# Patient Record
Sex: FEMALE | Race: WHITE | Employment: FULL TIME | URBAN - METROPOLITAN AREA
[De-identification: names, ages, dates, MRNs, and addresses within clinical notes are randomized per-mention and may not be internally consistent; named-entity substitution may affect disease eponyms.]

---

## 2018-12-07 ENCOUNTER — OFFICE VISIT (OUTPATIENT)
Dept: URGENT CARE | Age: 53
End: 2018-12-07

## 2018-12-07 ENCOUNTER — HOSPITAL ENCOUNTER (EMERGENCY)
Age: 53
Discharge: HOME | End: 2018-12-07
Attending: EMERGENCY MEDICINE | Admitting: EMERGENCY MEDICINE
Payer: COMMERCIAL

## 2018-12-07 ENCOUNTER — APPOINTMENT (OUTPATIENT)
Dept: GENERAL RADIOLOGY | Age: 53
End: 2018-12-07
Attending: PHYSICIAN ASSISTANT
Payer: COMMERCIAL

## 2018-12-07 VITALS
WEIGHT: 190.7 LBS | BODY MASS INDEX: 32.56 KG/M2 | HEART RATE: 77 BPM | RESPIRATION RATE: 18 BRPM | TEMPERATURE: 98.1 F | DIASTOLIC BLOOD PRESSURE: 85 MMHG | OXYGEN SATURATION: 98 % | HEIGHT: 64 IN | SYSTOLIC BLOOD PRESSURE: 149 MMHG

## 2018-12-07 VITALS
SYSTOLIC BLOOD PRESSURE: 192 MMHG | DIASTOLIC BLOOD PRESSURE: 85 MMHG | TEMPERATURE: 98.4 F | WEIGHT: 191 LBS | HEIGHT: 64 IN | RESPIRATION RATE: 18 BRPM | OXYGEN SATURATION: 98 % | HEART RATE: 93 BPM | BODY MASS INDEX: 32.61 KG/M2

## 2018-12-07 DIAGNOSIS — R42 DIZZY: ICD-10-CM

## 2018-12-07 DIAGNOSIS — R11.0 NAUSEA: ICD-10-CM

## 2018-12-07 DIAGNOSIS — R03.0 ELEVATED BLOOD PRESSURE READING: Primary | ICD-10-CM

## 2018-12-07 LAB
ALBUMIN SERPL-MCNC: 3.7 G/DL (ref 3.5–5)
ALBUMIN/GLOB SERPL: 0.9 {RATIO} (ref 1.1–2.2)
ALP SERPL-CCNC: 89 U/L (ref 45–117)
ALT SERPL-CCNC: 33 U/L (ref 12–78)
ANION GAP SERPL CALC-SCNC: 6 MMOL/L (ref 5–15)
AST SERPL-CCNC: 39 U/L (ref 15–37)
ATRIAL RATE: 78 BPM
BASOPHILS # BLD: 0 K/UL (ref 0–0.1)
BASOPHILS NFR BLD: 0 % (ref 0–1)
BILIRUB SERPL-MCNC: 0.3 MG/DL (ref 0.2–1)
BUN SERPL-MCNC: 13 MG/DL (ref 6–20)
BUN/CREAT SERPL: 17 (ref 12–20)
CALCIUM SERPL-MCNC: 9.1 MG/DL (ref 8.5–10.1)
CALCULATED P AXIS, ECG09: 44 DEGREES
CALCULATED R AXIS, ECG10: 36 DEGREES
CALCULATED T AXIS, ECG11: 35 DEGREES
CHLORIDE SERPL-SCNC: 108 MMOL/L (ref 97–108)
CK MB CFR SERPL CALC: 0.7 % (ref 0–2.5)
CK MB SERPL-MCNC: 1.1 NG/ML (ref 5–25)
CK SERPL-CCNC: 148 U/L (ref 26–192)
CO2 SERPL-SCNC: 26 MMOL/L (ref 21–32)
CREAT SERPL-MCNC: 0.78 MG/DL (ref 0.55–1.02)
DIAGNOSIS, 93000: NORMAL
DIFFERENTIAL METHOD BLD: NORMAL
EOSINOPHIL # BLD: 0 K/UL (ref 0–0.4)
EOSINOPHIL NFR BLD: 0 % (ref 0–7)
ERYTHROCYTE [DISTWIDTH] IN BLOOD BY AUTOMATED COUNT: 13 % (ref 11.5–14.5)
GLOBULIN SER CALC-MCNC: 3.9 G/DL (ref 2–4)
GLUCOSE SERPL-MCNC: 105 MG/DL (ref 65–100)
HCT VFR BLD AUTO: 42.4 % (ref 35–47)
HGB BLD-MCNC: 14.8 G/DL (ref 11.5–16)
IMM GRANULOCYTES # BLD: 0 K/UL (ref 0–0.04)
IMM GRANULOCYTES NFR BLD AUTO: 0 % (ref 0–0.5)
LYMPHOCYTES # BLD: 1.3 K/UL (ref 0.8–3.5)
LYMPHOCYTES NFR BLD: 25 % (ref 12–49)
MCH RBC QN AUTO: 30.4 PG (ref 26–34)
MCHC RBC AUTO-ENTMCNC: 34.9 G/DL (ref 30–36.5)
MCV RBC AUTO: 87.1 FL (ref 80–99)
MONOCYTES # BLD: 0.4 K/UL (ref 0–1)
MONOCYTES NFR BLD: 8 % (ref 5–13)
NEUTS SEG # BLD: 3.6 K/UL (ref 1.8–8)
NEUTS SEG NFR BLD: 66 % (ref 32–75)
NRBC # BLD: 0 K/UL (ref 0–0.01)
NRBC BLD-RTO: 0 PER 100 WBC
P-R INTERVAL, ECG05: 154 MS
PLATELET # BLD AUTO: 313 K/UL (ref 150–400)
PMV BLD AUTO: 9.7 FL (ref 8.9–12.9)
POTASSIUM SERPL-SCNC: 4.4 MMOL/L (ref 3.5–5.1)
PROT SERPL-MCNC: 7.6 G/DL (ref 6.4–8.2)
Q-T INTERVAL, ECG07: 394 MS
QRS DURATION, ECG06: 86 MS
QTC CALCULATION (BEZET), ECG08: 449 MS
RBC # BLD AUTO: 4.87 M/UL (ref 3.8–5.2)
SODIUM SERPL-SCNC: 140 MMOL/L (ref 136–145)
TROPONIN I SERPL-MCNC: <0.05 NG/ML
VENTRICULAR RATE, ECG03: 78 BPM
WBC # BLD AUTO: 5.3 K/UL (ref 3.6–11)

## 2018-12-07 PROCEDURE — 93005 ELECTROCARDIOGRAM TRACING: CPT

## 2018-12-07 PROCEDURE — 82553 CREATINE MB FRACTION: CPT

## 2018-12-07 PROCEDURE — 80053 COMPREHEN METABOLIC PANEL: CPT

## 2018-12-07 PROCEDURE — 36415 COLL VENOUS BLD VENIPUNCTURE: CPT

## 2018-12-07 PROCEDURE — 71046 X-RAY EXAM CHEST 2 VIEWS: CPT

## 2018-12-07 PROCEDURE — 85025 COMPLETE CBC W/AUTO DIFF WBC: CPT

## 2018-12-07 PROCEDURE — 84484 ASSAY OF TROPONIN QUANT: CPT

## 2018-12-07 PROCEDURE — 99284 EMERGENCY DEPT VISIT MOD MDM: CPT

## 2018-12-07 RX ORDER — CLONIDINE HYDROCHLORIDE 0.1 MG/1
0.1 TABLET ORAL
Status: DISCONTINUED | OUTPATIENT
Start: 2018-12-07 | End: 2018-12-07 | Stop reason: HOSPADM

## 2018-12-07 RX ORDER — AMLODIPINE BESYLATE 2.5 MG/1
2.5 TABLET ORAL DAILY
Qty: 14 TAB | Refills: 0 | Status: SHIPPED | OUTPATIENT
Start: 2018-12-07 | End: 2018-12-11 | Stop reason: ALTCHOICE

## 2018-12-07 NOTE — PROGRESS NOTES
At work today started to not feel well with dizziness, headache, nausea  She checked her BP and it was 190s over 100s  She does promote some associated left arm pain this morning without neck, jaw, chest or back pain; says unsure if from workout or if related to her concern today. She denies any chest pain in office currently. She does not have a PCP and has not had any blood work in past few years; does have follow up scheduled in 3-4 days. Is not a smoker  + hx of elevated cholesterol  +told BP high in past; no active diagnosis of HTN  No diagnosis of diabetes. Past Medical History:   Diagnosis Date    Hypertension         Past Surgical History:   Procedure Laterality Date    HX HEENT           History reviewed. No pertinent family history. Social History     Socioeconomic History    Marital status: UNKNOWN     Spouse name: Not on file    Number of children: Not on file    Years of education: Not on file    Highest education level: Not on file   Social Needs    Financial resource strain: Not on file    Food insecurity - worry: Not on file    Food insecurity - inability: Not on file   [place] needs - medical: Not on file   [employer] needs - non-medical: Not on file   Occupational History    Not on file   Tobacco Use    Smoking status: Never Smoker    Smokeless tobacco: Never Used   Substance and Sexual Activity    Alcohol use: Not on file    Drug use: Not on file    Sexual activity: Not on file   Other Topics Concern    Not on file   Social History Narrative    Not on file                ALLERGIES: Patient has no known allergies. Review of Systems   Eyes: Positive for photophobia. Respiratory: Negative for shortness of breath. Gastrointestinal: Positive for nausea. Negative for abdominal distention, abdominal pain and vomiting. Endocrine: Negative for polyuria. Skin: Negative for rash. Neurological: Negative for syncope.    All other systems reviewed and are negative. Vitals:    12/07/18 1221   BP: 192/85   Pulse: 93   Resp: 18   Temp: 98.4 °F (36.9 °C)   SpO2: 98%   Weight: 191 lb (86.6 kg)   Height: 5' 4\" (1.626 m)       Physical Exam   Constitutional: She is oriented to person, place, and time. No distress. HENT:   Head: Normocephalic and atraumatic. Mouth/Throat: Oropharynx is clear and moist. No oropharyngeal exudate. Eyes: Conjunctivae and EOM are normal. Pupils are equal, round, and reactive to light. Neck: Normal range of motion. Neck supple. No thyromegaly present. Cardiovascular: Normal rate, regular rhythm and normal heart sounds. Exam reveals no gallop and no friction rub. No murmur heard. Pulmonary/Chest: Effort normal and breath sounds normal. No respiratory distress. She has no wheezes. She has no rales. Lymphadenopathy:     She has no cervical adenopathy. Neurological: She is alert and oriented to person, place, and time. Skin: Skin is warm and dry. No rash noted. She is not diaphoretic. Psychiatric: She has a normal mood and affect. Her behavior is normal. Thought content normal.       MDM     Differential Diagnosis; Clinical Impression; Plan:         CLINICAL IMPRESSION:  (R03.0) Elevated blood pressure reading  (primary encounter diagnosis)  (R11.0) Nausea  (R42) Dizzy    Orders Placed This Encounter      AMB POC EKG ROUTINE W/ 12 LEADS, INTER & REP    Plan:  EKG: no evidence suggesting ACS on EKG. Given risk factors and presentation today have advised further evaluation and management in ED. No active chest pain. VS stable. Declines EMS. Will be riding by car. 911 precautions given. List of [facility] EDs given.              Procedures

## 2018-12-07 NOTE — PATIENT INSTRUCTIONS
Go to one of the following below immediately without delay:    [address]   ([address])  [place]   [phone]   Open 24 hours    [address]   ([address])  [address]   [id number] 24 hours    [address]  ([address])  [address] [place] [address]   [phone]

## 2018-12-07 NOTE — ED NOTES
ED MD made aware of 2 consecutive blood pressures 140/80s. Pt reports only mild headache at this time that she reports is manageable. Per ED MD ok to hold Clonidine.

## 2018-12-07 NOTE — ED PROVIDER NOTES
EMERGENCY DEPARTMENT HISTORY AND PHYSICAL EXAM      Date: 12/7/2018  Patient Name: Julia Lyle    PROVIDER IN TRIAGE NOTE:  1:07 PM  Janet Borrero PA-C has evaluated the patient as the Provider in Triage (PIT) for left ches pain that started this morning. She notes HA last night. The vital signs and the triage nurse assessment have been reviewed. The patient and any available family have been advised that the appropriate studies have been ordered to initiate the work up based on the clinical presentation during the assessment. The pt has been advised that they will be accommodated in monitored ED as soon as possible. The pt has been requested to contact the triage nurse or PIT immediately if they experiences any changes in their condition during this brief waiting period. History of Presenting Illness     Chief Complaint   Patient presents with    Headache     Pt ambulatory to triage with c/o intermittent h/a x last night    Nausea     x last night with assocaited h/a    Hypertension     no known hx, not on medications     Chest Pain     SOCRATES side to L shoudler       History Provided By: Patient    HPI: Julia Lyle, 48 y.o. female with no significant PMHx presents ambulatory to the ED referred by UC for elevated BP of 192/85 today. Pt states her BP has been elevated for the past couple of days, with highest reading of 185/102 earlier today. She reports experiencing intermittent HA since yesterday. Pt states her HA yesterday was localized to bilateral temples, but today was localized to posterior neck area. She describes both headaches as dull and \"nagging. \" Pt notes additional intermittent mild nausea. She also reports \"pulling sensation\" in her L shoulder, but denies hossein CP. At the time of examination, pt denies any FRANCO. Pt states she was seen at her OB/GYN last week and was noted to have elevated BP at that time, and set up an appointment to see her PCP next week.  She states she has been told in the past that she was borderline hypertensive, but has not had a formal diagnosis. Pt states she has been eating take out recently and reports increased salt intake. Pt reports father has a hx of HTN. She specifically SOB, fevers, chills, cough, or any other complaints. There are no other complaints, changes, or physical findings at this time. PCP: None    Current Facility-Administered Medications   Medication Dose Route Frequency Provider Last Rate Last Dose    cloNIDine HCl (CATAPRES) tablet 0.1 mg  0.1 mg Oral NOW Jose Rosenberg MD         Current Outpatient Medications   Medication Sig Dispense Refill    amLODIPine (NORVASC) 2.5 mg tablet Take 1 Tab by mouth daily for 14 days. 14 Tab 0    conjugated estrogens (PREMARIN) 0.625 mg/gram vaginal cream Insert 0.5 g into vagina daily.  conjugated estrogens (PREMARIN) 0.3 mg tablet Take  by mouth. Past History     Past Medical History:  Past Medical History:   Diagnosis Date    Hypertension        Past Surgical History:  Past Surgical History:   Procedure Laterality Date    HX HEENT         Family History:  No family history on file. Social History:  Social History     Tobacco Use    Smoking status: Never Smoker    Smokeless tobacco: Never Used   Substance Use Topics    Alcohol use: Not on file    Drug use: Not on file       Allergies:  No Known Allergies      Review of Systems   Review of Systems   Constitutional: Negative for chills and fever. HENT: Negative for congestion. Eyes: Negative for visual disturbance. Respiratory: Negative for cough, chest tightness and shortness of breath. Cardiovascular: Negative for chest pain and leg swelling. Gastrointestinal: Positive for nausea. Negative for abdominal pain and vomiting. Endocrine: Negative for polyuria. Genitourinary: Negative for dysuria and frequency. Musculoskeletal: Positive for arthralgias (pulling sensation L shoulder). Negative for myalgias.    Skin: Negative for color change. Allergic/Immunologic: Negative for immunocompromised state. Neurological: Positive for headaches. Negative for weakness and numbness. Physical Exam   Physical Exam   Nursing note and vitals reviewed.   General appearance: non-toxic, no distress  Eyes: PERRL, EOMI, conjunctiva normal, anicteric sclera  HEENT: mucous membranes moist, oropharynx is clear, neck is supple  Pulmonary: clear to auscultation bilaterally  Cardiac: normal rate and regular rhythm, no murmurs, gallops, or rubs, 2+ peripheral pulses, no carotid bruits, cap refill < 2 seconds  Abdomen: soft, nontender, nondistended, bowel sounds present  MSK: no lower extremity edema  Neuro: Alert, answers questions appropriately, 5/5 strength in all 4 extremities, VFF, light touch intact in all four extremities, cranial nerves II-XII intact      Diagnostic Study Results     Labs -     Recent Results (from the past 12 hour(s))   EKG, 12 LEAD, INITIAL    Collection Time: 12/07/18  1:10 PM   Result Value Ref Range    Ventricular Rate 78 BPM    Atrial Rate 78 BPM    P-R Interval 154 ms    QRS Duration 86 ms    Q-T Interval 394 ms    QTC Calculation (Bezet) 449 ms    Calculated P Axis 44 degrees    Calculated R Axis 36 degrees    Calculated T Axis 35 degrees    Diagnosis       Normal sinus rhythm      No previous ECGs available  Confirmed by Zaynab Francisco ([id number]) on 12/7/2018 2:93:36 PM     METABOLIC PANEL, COMPREHENSIVE    Collection Time: 12/07/18  2:02 PM   Result Value Ref Range    Sodium 140 136 - 145 mmol/L    Potassium 4.4 3.5 - 5.1 mmol/L    Chloride 108 97 - 108 mmol/L    CO2 26 21 - 32 mmol/L    Anion gap 6 5 - 15 mmol/L    Glucose 105 (H) 65 - 100 mg/dL    BUN 13 6 - 20 MG/DL    Creatinine 0.78 0.55 - 1.02 MG/DL    BUN/Creatinine ratio 17 12 - 20      GFR est AA >60 >60 ml/min/1.73m2    GFR est non-AA >60 >60 ml/min/1.73m2    Calcium 9.1 8.5 - 10.1 MG/DL    Bilirubin, total 0.3 0.2 - 1.0 MG/DL    ALT (SGPT) 33 12 - 78 U/L    AST (SGOT) 39 (H) 15 - 37 U/L    Alk. phosphatase 89 45 - 117 U/L    Protein, total 7.6 6.4 - 8.2 g/dL    Albumin 3.7 3.5 - 5.0 g/dL    Globulin 3.9 2.0 - 4.0 g/dL    A-G Ratio 0.9 (L) 1.1 - 2.2     TROPONIN I    Collection Time: 12/07/18  2:02 PM   Result Value Ref Range    Troponin-I, Qt. <0.05 <0.05 ng/mL   CK W/ CKMB & INDEX    Collection Time: 12/07/18  2:02 PM   Result Value Ref Range     26 - 192 U/L    CK - MB 1.1 <3.6 NG/ML    CK-MB Index 0.7 0 - 2.5     CBC WITH AUTOMATED DIFF    Collection Time: 12/07/18  2:50 PM   Result Value Ref Range    WBC 5.3 3.6 - 11.0 K/uL    RBC 4.87 3.80 - 5.20 M/uL    HGB 14.8 11.5 - 16.0 g/dL    HCT 42.4 35.0 - 47.0 %    MCV 87.1 80.0 - 99.0 FL    MCH 30.4 26.0 - 34.0 PG    MCHC 34.9 30.0 - 36.5 g/dL    RDW 13.0 11.5 - 14.5 %    PLATELET 443 111 - 284 K/uL    MPV 9.7 8.9 - 12.9 FL    NRBC 0.0 0  WBC    ABSOLUTE NRBC 0.00 0.00 - 0.01 K/uL    NEUTROPHILS 66 32 - 75 %    LYMPHOCYTES 25 12 - 49 %    MONOCYTES 8 5 - 13 %    EOSINOPHILS 0 0 - 7 %    BASOPHILS 0 0 - 1 %    IMMATURE GRANULOCYTES 0 0.0 - 0.5 %    ABS. NEUTROPHILS 3.6 1.8 - 8.0 K/UL    ABS. LYMPHOCYTES 1.3 0.8 - 3.5 K/UL    ABS. MONOCYTES 0.4 0.0 - 1.0 K/UL    ABS. EOSINOPHILS 0.0 0.0 - 0.4 K/UL    ABS. BASOPHILS 0.0 0.0 - 0.1 K/UL    ABS. IMM. GRANS. 0.0 0.00 - 0.04 K/UL    DF AUTOMATED         Radiologic Studies -   CXR Results  (Last 48 hours)               12/07/18 1432  XR CHEST PA LAT Final result    Impression:  IMPRESSION: No acute cardiopulmonary disease. Narrative: Indication: Chest Pain, HTN. Exam: PA and lateral views of the chest.       There is no prior study for direct comparison. Findings: Cardiomediastinal silhouette is within normal limits. Lungs are clear   bilaterally. Pleural spaces are normal. Osseous structures are intact. Medical Decision Making   I am the first provider for this patient.     I reviewed the vital signs, available nursing notes, past medical history, past surgical history, family history and social history. Vital Signs-Reviewed the patient's vital signs. Patient Vitals for the past 12 hrs:   Temp Pulse Resp BP SpO2   12/07/18 1615 -- 77 18 149/85 98 %   12/07/18 1600 -- 74 15 138/80 99 %   12/07/18 1545 -- 75 15 149/89 98 %   12/07/18 1530 -- 77 20 147/86 99 %   12/07/18 1307 98.1 °F (36.7 °C) 89 18 (!) 174/109 100 %       Pulse Oximetry Analysis - 100% on RA    EKG interpretation: 1310  Rhythm: Normal Sinus Rhythm and Regular. Rate (approx.): 78; Axis: normal; ST/T wave: no ST elevation, no ST depression;   NM interval 154 ms, QRS 86 ms, QTc 449 ms. Records Reviewed: Nursing Notes, Old Medical Records, Previous Radiology Studies and Previous Laboratory Studies    Provider Notes (Medical Decision Making):   Elevated BP likely worsened by dietary salt intake. Exam reassuring. No signs of ongoing end organ injury. ED Course:   Initial assessment performed. The patients presenting problems have been discussed, and they are in agreement with the care plan formulated and outlined with them. I have encouraged them to ask questions as they arise throughout their visit. Progress Note:  4:00 PM  Patient states she is feeling better. BP stable at 138/80. Pt has been directed to monitor BP at home. Will discharge home on 2.5mg Norvasc. Discharge Note:  4:03 PM  The patient has been re-evaluated and is ready for discharge. Reviewed available results with patient. Counseled patient on diagnosis and care plan. Patient has expressed understanding, and all questions have been answered. Patient agrees with plan and agrees to follow up as recommended, or to return to the ED if their symptoms worsen. Discharge instructions have been provided and explained to the patient, along with reasons to return to the ED. PLAN:  1.    Discharge Medication List as of 12/7/2018  4:05 PM      START taking these medications    Details amLODIPine (NORVASC) 2.5 mg tablet Take 1 Tab by mouth daily for 14 days. , Normal, Disp-14 Tab, R-0         CONTINUE these medications which have NOT CHANGED    Details   conjugated estrogens (PREMARIN) 0.625 mg/gram vaginal cream Insert 0.5 g into vagina daily. , Historical Med      conjugated estrogens (PREMARIN) 0.3 mg tablet Take  by mouth., Historical Med           2. Follow-up Information     Follow up With Specialties Details Why Contact Info    Lalo Braden MD Internal Medicine Go in 4 days  [address] [facility] [address] [phone]      [facility] EMERGENCY DEPT Emergency Medicine Go in 1 day If symptoms worsen [address] [place] [address]  [phone]        Return to ED if worse     Diagnosis     Clinical Impression:   1. Elevated blood pressure reading        Attestations: This note is prepared by Rick Gamboa, acting as Scribe for [employer]. Sheryn Seip, MD.    The scribe's documentation has been prepared under my direction and personally reviewed by me in its entirety. I confirm that the note above accurately reflects all work, treatment, procedures, and medical decision making performed by me. [employer].  Sheryn Seip, MD

## 2018-12-07 NOTE — DISCHARGE INSTRUCTIONS
Elevated Blood Pressure: Care Instructions  Your Care Instructions    Blood pressure is a measure of how hard the blood pushes against the walls of your arteries. It's normal for blood pressure to go up and down throughout the day. But if it stays up over time, you have high blood pressure. Two numbers tell you your blood pressure. The first number is the systolic pressure. It shows how hard the blood pushes when your heart is pumping. The second number is the diastolic pressure. It shows how hard the blood pushes between heartbeats, when your heart is relaxed and filling with blood. An ideal blood pressure in adults is less than 120/80 (say \"120 over 80\"). High blood pressure is 140/90 or higher. You have high blood pressure if your top number is 140 or higher or your bottom number is 90 or higher, or both. The main test for high blood pressure is simple, fast, and painless. To diagnose high blood pressure, your doctor will test your blood pressure at different times. After testing your blood pressure, your doctor may ask you to test it again when you are home. If you are diagnosed with high blood pressure, you can work with your doctor to make a long-term plan to manage it. Follow-up care is a key part of your treatment and safety. Be sure to make and go to all appointments, and call your doctor if you are having problems. It's also a good idea to know your test results and keep a list of the medicines you take. How can you care for yourself at home? · Do not smoke. Smoking increases your risk for heart attack and stroke. If you need help quitting, talk to your doctor about stop-smoking programs and medicines. These can increase your chances of quitting for good. · Stay at a healthy weight. · Try to limit how much sodium you eat to less than 2,300 milligrams (mg) a day. Your doctor may ask you to try to eat less than 1,500 mg a day. · Be physically active.  Get at least 30 minutes of exercise on most days of the week. Walking is a good choice. You also may want to do other activities, such as running, swimming, cycling, or playing tennis or team sports. · Avoid or limit alcohol. Talk to your doctor about whether you can drink any alcohol. · Eat plenty of fruits, vegetables, and low-fat dairy products. Eat less saturated and total fats. · Learn how to check your blood pressure at home. When should you call for help? Call your doctor now or seek immediate medical care if:  ? · Your blood pressure is much higher than normal (such as 180/110 or higher). ? · You think high blood pressure is causing symptoms such as:  ¨ Severe headache. ¨ Blurry vision. ? Watch closely for changes in your health, and be sure to contact your doctor if:  ? · You do not get better as expected. Where can you learn more? Go to http://sarahiArizona State Universityml.info/. Enter N351 in the search box to learn more about \"Elevated Blood Pressure: Care Instructions. \"  Current as of: September 21, 2016  Content Version: 11.4  © 6008-6296 [employer]. Care instructions adapted under license by [employer] (which disclaims liability or warranty for this information). If you have questions about a medical condition or this instruction, always ask your healthcare professional. [address] any warranty or liability for your use of this information. DASH Diet: Care Instructions  Your Care Instructions    The DASH diet is an eating plan that can help lower your blood pressure. DASH stands for Dietary Approaches to Stop Hypertension. Hypertension is high blood pressure. The DASH diet focuses on eating foods that are high in calcium, potassium, and magnesium. These nutrients can lower blood pressure. The foods that are highest in these nutrients are fruits, vegetables, low-fat dairy products, nuts, seeds, and legumes.  But taking calcium, potassium, and magnesium supplements instead of eating foods that are high in those nutrients does not have the same effect. The DASH diet also includes whole grains, fish, and poultry. The DASH diet is one of several lifestyle changes your doctor may recommend to lower your high blood pressure. Your doctor may also want you to decrease the amount of sodium in your diet. Lowering sodium while following the DASH diet can lower blood pressure even further than just the DASH diet alone. Follow-up care is a key part of your treatment and safety. Be sure to make and go to all appointments, and call your doctor if you are having problems. It's also a good idea to know your test results and keep a list of the medicines you take. How can you care for yourself at home? Following the DASH diet  · Eat 4 to 5 servings of fruit each day. A serving is 1 medium-sized piece of fruit, ½ cup chopped or canned fruit, 1/4 cup dried fruit, or 4 ounces (½ cup) of fruit juice. Choose fruit more often than fruit juice. · Eat 4 to 5 servings of vegetables each day. A serving is 1 cup of lettuce or raw leafy vegetables, ½ cup of chopped or cooked vegetables, or 4 ounces (½ cup) of vegetable juice. Choose vegetables more often than vegetable juice. · Get 2 to 3 servings of low-fat and fat-free dairy each day. A serving is 8 ounces of milk, 1 cup of yogurt, or 1 ½ ounces of cheese. · Eat 6 to 8 servings of grains each day. A serving is 1 slice of bread, 1 ounce of dry cereal, or ½ cup of cooked rice, pasta, or cooked cereal. Try to choose whole-grain products as much as possible. · Limit lean meat, poultry, and fish to 2 servings each day. A serving is 3 ounces, about the size of a deck of cards. · Eat 4 to 5 servings of nuts, seeds, and legumes (cooked dried beans, lentils, and split peas) each week. A serving is 1/3 cup of nuts, 2 tablespoons of seeds, or ½ cup of cooked beans or peas. · Limit fats and oils to 2 to 3 servings each day.  A serving is 1 teaspoon of vegetable oil or 2 tablespoons of salad dressing. · Limit sweets and added sugars to 5 servings or less a week. A serving is 1 tablespoon jelly or jam, ½ cup sorbet, or 1 cup of lemonade. · Eat less than 2,300 milligrams (mg) of sodium a day. If you limit your sodium to 1,500 mg a day, you can lower your blood pressure even more. Tips for success  · Start small. Do not try to make dramatic changes to your diet all at once. You might feel that you are missing out on your favorite foods and then be more likely to not follow the plan. Make small changes, and stick with them. Once those changes become habit, add a few more changes. · Try some of the following:  ? Make it a goal to eat a fruit or vegetable at every meal and at snacks. This will make it easy to get the recommended amount of fruits and vegetables each day. ? Try yogurt topped with fruit and nuts for a snack or healthy dessert. ? Add lettuce, tomato, cucumber, and onion to sandwiches. ? Combine a ready-made pizza crust with low-fat mozzarella cheese and lots of vegetable toppings. Try using tomatoes, squash, spinach, broccoli, carrots, cauliflower, and onions. ? Have a variety of cut-up vegetables with a low-fat dip as an appetizer instead of chips and dip. ? Sprinkle sunflower seeds or chopped almonds over salads. Or try adding chopped walnuts or almonds to cooked vegetables. ? Try some vegetarian meals using beans and peas. Add garbanzo or kidney beans to salads. Make burritos and tacos with mashed boyce beans or black beans. Where can you learn more? Go to http://sarahi-ml.info/. Enter E041 in the search box to learn more about \"DASH Diet: Care Instructions. \"  Current as of: December 6, 2017  Content Version: 11.8  © 5642-2052 [employer]. Care instructions adapted under license by [employer] (which disclaims liability or warranty for this information).  If you have questions about a medical condition or this instruction, always ask your healthcare professional. [place] [address] any warranty or liability for your use of this information.

## 2018-12-07 NOTE — ED NOTES
Pt to ED with c/o HA without photophobia, worsening this morning. Hx htn. Also reports tightness from left shoulder to mid-left arm. Denies CP at present. +nausea, no vomiting.

## 2018-12-11 ENCOUNTER — OFFICE VISIT (OUTPATIENT)
Dept: PRIMARY CARE CLINIC | Age: 53
End: 2018-12-11

## 2018-12-11 VITALS
SYSTOLIC BLOOD PRESSURE: 158 MMHG | DIASTOLIC BLOOD PRESSURE: 92 MMHG | HEIGHT: 64 IN | TEMPERATURE: 98.6 F | OXYGEN SATURATION: 100 % | HEART RATE: 89 BPM | BODY MASS INDEX: 32.58 KG/M2 | WEIGHT: 190.8 LBS | RESPIRATION RATE: 18 BRPM

## 2018-12-11 DIAGNOSIS — R74.8 ELEVATED LIVER ENZYMES: ICD-10-CM

## 2018-12-11 DIAGNOSIS — I10 ESSENTIAL HYPERTENSION: Primary | ICD-10-CM

## 2018-12-11 DIAGNOSIS — E66.9 OBESITY (BMI 30-39.9): ICD-10-CM

## 2018-12-11 DIAGNOSIS — N95.1 HOT FLASHES DUE TO MENOPAUSE: ICD-10-CM

## 2018-12-11 RX ORDER — BISMUTH SUBSALICYLATE 262 MG
1 TABLET,CHEWABLE ORAL DAILY
COMMUNITY

## 2018-12-11 RX ORDER — LISINOPRIL AND HYDROCHLOROTHIAZIDE 10; 12.5 MG/1; MG/1
1 TABLET ORAL DAILY
Qty: 30 TAB | Refills: 0 | Status: SHIPPED | OUTPATIENT
Start: 2018-12-11 | End: 2019-01-09 | Stop reason: SDUPTHER

## 2018-12-11 RX ORDER — PROGESTERONE 100 MG/1
100 CAPSULE ORAL DAILY
COMMUNITY
End: 2021-07-07 | Stop reason: ALTCHOICE

## 2018-12-11 NOTE — PROGRESS NOTES
Visit Vitals  /86 (BP 1 Location: Left arm, BP Patient Position: Sitting)   Pulse 89   Temp 98.6 °F (37 °C) (Oral)   Resp 18   Ht 5' 4\" (1.626 m)   Wt 190 lb 12.8 oz (86.5 kg)   SpO2 100%   BMI 32.75 kg/m²       Chief Complaint   Patient presents with   [address]    Hypertension     ER Visit on 12/07/2018 /104- Started on Norvasc 2.5mg     Weight Management     Was taking Contrave x 2 days, and stopped d/t elevated BP. 1. Have you been to the ER, urgent care clinic since your last visit? Hospitalized since your last visit? [facility] ER 12/07/2018 for HTN Crisis     2. Have you seen or consulted any other health care providers outside of the [address] [place] [address] since your last visit? Include any pap smears or colon screening.  OB/GYN Dr. Boone Frankel in [place]

## 2018-12-11 NOTE — PROGRESS NOTES
Written by Lisette Lemos, as dictated by Dr. Emerita Joseph MD.    Esther Gotti is a 48 y.o. female. HPI  The patient comes in today to establish care. Patient has not been seeing a PCP, but notes that her BP is high when checked at home. She saw her OB/GYN 2 weeks ago and she notes that her BP was very high. When she used her new BP monitor at home last week her BP was very high. She notes that her BP got up to 190/104 and she ended up in the [facility] ED on 12/07. The patient notes that she had a headache and was feeling nauseous due to her BP. Patient was prescribed amlodipine 2.5 mg, which she took this morning. Denies constipation and leg swelling. The patient notes that she was not administered clonidine in the hospital as her BP was normal right before they were about to administer it. BP is high today at 161/86, 158/92 on repeat. Patient believes her weight is elevating her BP, as her BP is normal when she weighs about 180 lbs. She weighs 190 lbs today. The patient notes fhx of HTN. Patient notes that she has a headache which started about 5 days ago, but she does not normally have headaches. She notes there is a virus going around at work and believes it may be causing her sxs and elevated BP. Patient is currently working out a few times per week with a . She tried intermittent fasting last year and lost about 12 lbs in 4 months, but notes that she gradually stopped the diet and gained the weight back. The patient stopped eating dinner at 6:30 pm, had black coffee in the morning, and broke her fast around 12:30-1 pm. Patient does not eat large quantities of food or fast food, but notes that she gains weight quickly if she eats out. She notes that she sometimes has swelling at the end of the day, depending on what she ate during the day. Years ago she tried a ketogenic diet without success, as well as other diet programs.  Patient started Contrave, which was prescribed for weight loss by her OB/GYN's PA. She only took Contrave for 2 days as it seemed to elevate her BP. The patient notes that she tried weight loss medication a long time ago, which provided relief. Discussed labs drawn when she was in the ED on 12/07. AST high at 39, other labs normal.    Denies constipation, but notes that she takes an OTC stool softener to help with her bowel movements as she has a minor rectal prolapse. Patient gets heartburn with sugary foods. Denies fhx of heart disease. Mammogram was at the [facility] in 08/2017. Pap smear is performed annually as she was HPV positive a couple years ago, and she believes it was in early 2018 and was normal. Patient's last menstrual cycle was several years ago and she has not had a hysterectomy. She has been taking Premarin 0.3 mg, Premarin cream, and Prometrium 100 mg for hot flashes, insomnia, and vaginal dryness. Patient notes some relief with these medications and notes that she has been taking magnesium with relief from her insomnia. The patient notes that she had the [facility] touch procedure, which provided relief and made intercourse less painful. She will have cosmetic surgery to treat facial puffiness. Her surgery was scheduled for 12/20 but needs to be rescheduled due to the snow. Patient refuses flu shot today. Current Outpatient Medications on File Prior to Visit   Medication Sig Dispense Refill    multivitamin (ONE A DAY) tablet Take 1 Tab by mouth daily.  MAGNESIUM GLUCONATE, BULK, by Does Not Apply route.  progesterone (PROMETRIUM) 100 mg capsule Take 100 mg by mouth daily.  conjugated estrogens (PREMARIN) 0.625 mg/gram vaginal cream Insert 0.5 g into vagina daily.  conjugated estrogens (PREMARIN) 0.3 mg tablet Take  by mouth. No current facility-administered medications on file prior to visit.         Past Medical History:   Diagnosis Date    Hypertension Past Surgical History:   Procedure Laterality Date    HX HEENT         Family History   Problem Relation Age of Onset    Hypertension Father        Social History     Socioeconomic History    Marital status: SINGLE     Spouse name: Not on file    Number of children: Not on file    Years of education: Not on file    Highest education level: Not on file   Social Needs    Financial resource strain: Not on file    Food insecurity - worry: Not on file    Food insecurity - inability: Not on file    Transportation needs - medical: Not on file   [employer] needs - non-medical: Not on file   Occupational History    Not on file   Tobacco Use    Smoking status: Never Smoker    Smokeless tobacco: Never Used   Substance and Sexual Activity    Alcohol use: No     Frequency: Never    Drug use: No    Sexual activity: Yes     Partners: Male     Birth control/protection: None   Other Topics Concern    Not on file   Social History Narrative    Not on file       Admission on 12/07/2018, Discharged on 12/07/2018   Component Date Value Ref Range Status    Ventricular Rate 12/07/2018 78  BPM Final    Atrial Rate 12/07/2018 78  BPM Final    P-R Interval 12/07/2018 154  ms Final    QRS Duration 12/07/2018 86  ms Final    Q-T Interval 12/07/2018 394  ms Final    QTC Calculation (Bezet) 12/07/2018 449  ms Final    Calculated P Axis 12/07/2018 44  degrees Final    Calculated R Axis 12/07/2018 36  degrees Final    Calculated T Axis 12/07/2018 35  degrees Final    Diagnosis 12/07/2018    Final                    Value:Normal sinus rhythm      No previous ECGs available  Confirmed by Vlad Graham ([id number]) on 12/7/2018 4:47:28 PM      Sodium 12/07/2018 140  136 - 145 mmol/L Final    Potassium 12/07/2018 4.4  3.5 - 5.1 mmol/L Final    Chloride 12/07/2018 108  97 - 108 mmol/L Final    CO2 12/07/2018 26  21 - 32 mmol/L Final    Anion gap 12/07/2018 6  5 - 15 mmol/L Final    Glucose 12/07/2018 105* 65 - 100 mg/dL Final    BUN 12/07/2018 13  6 - 20 MG/DL Final    Creatinine 12/07/2018 0.78  0.55 - 1.02 MG/DL Final    BUN/Creatinine ratio 12/07/2018 17  12 - 20   Final    GFR est AA 12/07/2018 >60  >60 ml/min/1.73m2 Final    GFR est non-AA 12/07/2018 >60  >60 ml/min/1.73m2 Final    Calcium 12/07/2018 9.1  8.5 - 10.1 MG/DL Final    Bilirubin, total 12/07/2018 0.3  0.2 - 1.0 MG/DL Final    ALT (SGPT) 12/07/2018 33  12 - 78 U/L Final    AST (SGOT) 12/07/2018 39* 15 - 37 U/L Final    Alk. phosphatase 12/07/2018 89  45 - 117 U/L Final    Protein, total 12/07/2018 7.6  6.4 - 8.2 g/dL Final    Albumin 12/07/2018 3.7  3.5 - 5.0 g/dL Final    Globulin 12/07/2018 3.9  2.0 - 4.0 g/dL Final    A-G Ratio 12/07/2018 0.9* 1.1 - 2.2   Final    Troponin-I, Qt. 12/07/2018 <0.05  <0.05 ng/mL Final    CK 12/07/2018 148  26 - 192 U/L Final    CK - MB 12/07/2018 1.1  <3.6 NG/ML Final    CK-MB Index 12/07/2018 0.7  0 - 2.5   Final    WBC 12/07/2018 5.3  3.6 - 11.0 K/uL Final    RBC 12/07/2018 4.87  3.80 - 5.20 M/uL Final    HGB 12/07/2018 14.8  11.5 - 16.0 g/dL Final    HCT 12/07/2018 42.4  35.0 - 47.0 % Final    MCV 12/07/2018 87.1  80.0 - 99.0 FL Final    MCH 12/07/2018 30.4  26.0 - 34.0 PG Final    MCHC 12/07/2018 34.9  30.0 - 36.5 g/dL Final    RDW 12/07/2018 13.0  11.5 - 14.5 % Final    PLATELET 34/29/6739 931  150 - 400 K/uL Final    MPV 12/07/2018 9.7  8.9 - 12.9 FL Final    NRBC 12/07/2018 0.0  0  WBC Final    ABSOLUTE NRBC 12/07/2018 0.00  0.00 - 0.01 K/uL Final    NEUTROPHILS 12/07/2018 66  32 - 75 % Final    LYMPHOCYTES 12/07/2018 25  12 - 49 % Final    MONOCYTES 12/07/2018 8  5 - 13 % Final    EOSINOPHILS 12/07/2018 0  0 - 7 % Final    BASOPHILS 12/07/2018 0  0 - 1 % Final    IMMATURE GRANULOCYTES 12/07/2018 0  0.0 - 0.5 % Final    ABS. NEUTROPHILS 12/07/2018 3.6  1.8 - 8.0 K/UL Final    ABS. LYMPHOCYTES 12/07/2018 1.3  0.8 - 3.5 K/UL Final    ABS.  MONOCYTES 12/07/2018 0.4  0.0 - 1.0 K/UL Final    ABS. EOSINOPHILS 12/07/2018 0.0  0.0 - 0.4 K/UL Final    ABS. BASOPHILS 12/07/2018 0.0  0.0 - 0.1 K/UL Final    ABS. IMM. GRANS. 12/07/2018 0.0  0.00 - 0.04 K/UL Final    DF 12/07/2018 AUTOMATED    Final       Review of Systems   Constitutional: Negative for malaise/fatigue. HENT: Negative for congestion. Eyes: Negative for blurred vision and pain. Respiratory: Negative for cough and shortness of breath. Cardiovascular: Negative for chest pain, palpitations and leg swelling. Gastrointestinal: Positive for heartburn. Negative for abdominal pain and constipation. Genitourinary: Negative for frequency and urgency. Musculoskeletal: Negative for joint pain and myalgias. Neurological: Positive for headaches. Negative for dizziness, tingling, sensory change and weakness. Psychiatric/Behavioral: Negative for depression, memory loss and substance abuse. The patient has insomnia. Visit Vitals  BP (!) 158/92 (BP 1 Location: Left arm, BP Patient Position: Sitting)   Pulse 89   Temp 98.6 °F (37 °C) (Oral)   Resp 18   Ht 5' 4\" (1.626 m)   Wt 190 lb 12.8 oz (86.5 kg)   SpO2 100%   BMI 32.75 kg/m²       Physical Exam   Constitutional: She is oriented to person, place, and time. She appears well-developed. No distress. Obese   HENT:   Right Ear: External ear normal.   Left Ear: External ear normal.   Eyes: Conjunctivae and EOM are normal. Right eye exhibits no discharge. Left eye exhibits no discharge. Neck: Normal range of motion. Neck supple. Cardiovascular: Normal rate and regular rhythm. Pulmonary/Chest: Effort normal and breath sounds normal. She has no wheezes. Abdominal: Soft. Bowel sounds are normal. There is no tenderness. Lymphadenopathy:     She has no cervical adenopathy. Neurological: She is alert and oriented to person, place, and time. Skin: She is not diaphoretic. Psychiatric: She has a normal mood and affect.  Her behavior is normal.   Nursing note and vitals reviewed. ASSESSMENT and PLAN    ICD-10-CM ICD-9-CM    1. Essential hypertension I10 401.9 lisinopril-hydroCHLOROthiazide (PRINZIDE, ZESTORETIC) 10-12.5 mg per tablet sent to pharmacy. Lisinopril-HCTZ 10-12.5 mg prescribed. Potential side effects were discussed. If she experiences a dry cough after about 1 week, she should let me know via [employer]. Patient should stop taking amlodipine. She should continue to monitor her BP at home and send me her readings via [employer]. If her readings are improved, she should follow-up at her physical exam in 1 month. If her readings do not improve, she should return for further treatment. 2. Hot flashes due to menopause N95.1 627.2 Followed by OB/GYN and compliant on medication. 3. Obesity (BMI 30-39. 9) E66.9 278.00 I recommend trying to follow a Mediterranean diet. She should do more cardio exercise than weight lifting as she is currently trying to lose weight. When she is ready for weight maintenance, she should do more weight lifting exercise. I would like her to do a brisk walk 30 minutes daily. 4. Elevated liver enzymes R74.8 790.5 I will repeat her labs when she returns in about 1 month. This plan was reviewed with the patient and patient agrees. All questions were answered. This scribe documentation was reviewed by me and accurately reflects the examination and decisions made by me. This note will not be viewable in [address]

## 2018-12-19 ENCOUNTER — TELEPHONE (OUTPATIENT)
Dept: PRIMARY CARE CLINIC | Age: 53
End: 2018-12-19

## 2019-01-09 DIAGNOSIS — I10 ESSENTIAL HYPERTENSION: ICD-10-CM

## 2019-01-09 RX ORDER — LISINOPRIL AND HYDROCHLOROTHIAZIDE 10; 12.5 MG/1; MG/1
1 TABLET ORAL DAILY
Qty: 90 TAB | Refills: 0 | Status: SHIPPED | OUTPATIENT
Start: 2019-01-09 | End: 2019-01-15 | Stop reason: ALTCHOICE

## 2019-01-15 ENCOUNTER — OFFICE VISIT (OUTPATIENT)
Dept: PRIMARY CARE CLINIC | Age: 54
End: 2019-01-15

## 2019-01-15 VITALS
SYSTOLIC BLOOD PRESSURE: 137 MMHG | TEMPERATURE: 98.2 F | BODY MASS INDEX: 32.61 KG/M2 | HEART RATE: 80 BPM | DIASTOLIC BLOOD PRESSURE: 85 MMHG | OXYGEN SATURATION: 99 % | HEIGHT: 64 IN | WEIGHT: 191 LBS | RESPIRATION RATE: 16 BRPM

## 2019-01-15 DIAGNOSIS — Z11.59 NEED FOR HEPATITIS C SCREENING TEST: ICD-10-CM

## 2019-01-15 DIAGNOSIS — Z00.00 PHYSICAL EXAM: Primary | ICD-10-CM

## 2019-01-15 DIAGNOSIS — Z12.11 COLON CANCER SCREENING: ICD-10-CM

## 2019-01-15 DIAGNOSIS — M54.50 ACUTE MIDLINE LOW BACK PAIN WITHOUT SCIATICA: ICD-10-CM

## 2019-01-15 DIAGNOSIS — E66.9 OBESITY (BMI 30.0-34.9): ICD-10-CM

## 2019-01-15 DIAGNOSIS — I10 ESSENTIAL HYPERTENSION: ICD-10-CM

## 2019-01-15 RX ORDER — CARVEDILOL 6.25 MG/1
6.25 TABLET ORAL 2 TIMES DAILY WITH MEALS
Qty: 60 TAB | Refills: 0 | Status: SHIPPED | OUTPATIENT
Start: 2019-01-15 | End: 2019-02-14

## 2019-01-15 NOTE — PROGRESS NOTES
Chief Complaint   Patient presents with    Complete Physical    Back Pain     lower     Visit Vitals  /85 (BP 1 Location: Left arm, BP Patient Position: Sitting)   Pulse 80   Temp 98.2 °F (36.8 °C) (Oral)   Resp 16   Ht 5' 4\" (1.626 m)   Wt 191 lb (86.6 kg)   SpO2 99%   BMI 32.79 kg/m²     1. Have you been to the ER, urgent care clinic since your last visit? Hospitalized since your last visit? yes eyelid surgery    2. Have you seen or consulted any other health care providers outside of the [address] [place] [address] since your last visit? Include any pap smears or colon screening.  No

## 2019-01-15 NOTE — PROGRESS NOTES
Written by Raghu Rosenberg, as dictated by Dr. Colonel Fede MD.    Quinton Sam is a 48 y.o. female. HPI  The patient comes in today for a complete physical examination and fasting labs. She has not been screened for Hepatitis C. She notes that she gets some seasonal allergies during the spring, but is not experiencing sxs at this time. Occasionally she experiences palpitations, which she notes is related to eating something sugary. Denies heartburn, constipation, urinary issues. BP is good today at 137/85, but patient notes that her BP starts going up around 6-7 pm and gets up to about 864/78 to 94 diastolic. Compliant on lisinopril-HCTZ 10/12.5 mg in the morning and notes that her BP was 147/90 prior to taking her medication this morning. Patient did not experience dry cough or any other side effects from lisinopril-HCTZ. She notes that on lisinopril-HCTZ she felt a little less bloated and was urinating a little more frequently, but she denies feeling bloated and finger swelling. Patient has been experiencing back pain since lifting her 3year old grandson. The pain was bad for 1 day, but not the pain is mild and comes and goes. She notes some soreness, which seems to be improving. The patient notes that moving her legs aggravates her back pain. Patient believes she needs to exercise more and strengthen her core. She took 2 doses of OTC ibuprofen which provided relief, but she tries not to take ibuprofen. Patient does not need medication at this time as her pain is improving. She weighs 191 lbs today and has gained weight from 190 lbs on 12/11. Patient has being trying to lose weight without success. She tried intermittent fasting and lost about 15 lbs, but stopped and gained the weight back. The patient had been going to the gym 3 days per week, but for the past few months she has only been going 1-2 times per week. She does not drink ETOH.     Followed by  Susie Queen (OB/GYN) who performed her breast exams and pap smears. Patient is still taking progesterone and a hormone compound which is labeled estrogen 50-50, which she notes helps her sleep at night. Her LMP was a few years ago. She has not had a colonoscopy. Last tetanus vaccine was about 1.5 years ago. Current Outpatient Medications on File Prior to Visit   Medication Sig Dispense Refill    multivitamin (ONE A DAY) tablet Take 1 Tab by mouth daily.  MAGNESIUM GLUCONATE, BULK, by Does Not Apply route.  progesterone (PROMETRIUM) 100 mg capsule Take 100 mg by mouth daily.  conjugated estrogens (PREMARIN) 0.625 mg/gram vaginal cream Insert 0.5 g into vagina daily.  conjugated estrogens (PREMARIN) 0.3 mg tablet Take  by mouth. No current facility-administered medications on file prior to visit.         Past Medical History:   Diagnosis Date    Hypertension        Past Surgical History:   Procedure Laterality Date    HX HEENT  12/20/2018    eye lid surgery       Family History   Problem Relation Age of Onset    Hypertension Father        Social History     Socioeconomic History    Marital status: SINGLE     Spouse name: Not on file    Number of children: Not on file    Years of education: Not on file    Highest education level: Not on file   Social Needs    Financial resource strain: Not on file    Food insecurity - worry: Not on file    Food insecurity - inability: Not on file   [employer] needs - medical: Not on file   [employer] needs - non-medical: Not on file   Occupational History    Not on file   Tobacco Use    Smoking status: Never Smoker    Smokeless tobacco: Never Used   Substance and Sexual Activity    Alcohol use: No     Frequency: Never    Drug use: No    Sexual activity: Yes     Partners: Male     Birth control/protection: None   Other Topics Concern    Not on file   Social History Narrative    Not on file       Admission on 12/07/2018, Discharged on 12/07/2018   Component Date Value Ref Range Status    Ventricular Rate 12/07/2018 78  BPM Final    Atrial Rate 12/07/2018 78  BPM Final    P-R Interval 12/07/2018 154  ms Final    QRS Duration 12/07/2018 86  ms Final    Q-T Interval 12/07/2018 394  ms Final    QTC Calculation (Bezet) 12/07/2018 449  ms Final    Calculated P Axis 12/07/2018 44  degrees Final    Calculated R Axis 12/07/2018 36  degrees Final    Calculated T Axis 12/07/2018 35  degrees Final    Diagnosis 12/07/2018    Final                    Value:Normal sinus rhythm      No previous ECGs available  Confirmed by Janee Kothari ([id number]) on 12/7/2018 4:47:28 PM      Sodium 12/07/2018 140  136 - 145 mmol/L Final    Potassium 12/07/2018 4.4  3.5 - 5.1 mmol/L Final    Chloride 12/07/2018 108  97 - 108 mmol/L Final    CO2 12/07/2018 26  21 - 32 mmol/L Final    Anion gap 12/07/2018 6  5 - 15 mmol/L Final    Glucose 12/07/2018 105* 65 - 100 mg/dL Final    BUN 12/07/2018 13  6 - 20 MG/DL Final    Creatinine 12/07/2018 0.78  0.55 - 1.02 MG/DL Final    BUN/Creatinine ratio 12/07/2018 17  12 - 20   Final    GFR est AA 12/07/2018 >60  >60 ml/min/1.73m2 Final    GFR est non-AA 12/07/2018 >60  >60 ml/min/1.73m2 Final    Calcium 12/07/2018 9.1  8.5 - 10.1 MG/DL Final    Bilirubin, total 12/07/2018 0.3  0.2 - 1.0 MG/DL Final    ALT (SGPT) 12/07/2018 33  12 - 78 U/L Final    AST (SGOT) 12/07/2018 39* 15 - 37 U/L Final    Alk.  phosphatase 12/07/2018 89  45 - 117 U/L Final    Protein, total 12/07/2018 7.6  6.4 - 8.2 g/dL Final    Albumin 12/07/2018 3.7  3.5 - 5.0 g/dL Final    Globulin 12/07/2018 3.9  2.0 - 4.0 g/dL Final    A-G Ratio 12/07/2018 0.9* 1.1 - 2.2   Final    Troponin-I, Qt. 12/07/2018 <0.05  <0.05 ng/mL Final    CK 12/07/2018 148  26 - 192 U/L Final    CK - MB 12/07/2018 1.1  <3.6 NG/ML Final    CK-MB Index 12/07/2018 0.7  0 - 2.5   Final    WBC 12/07/2018 5.3  3.6 - 11.0 K/uL Final    RBC 12/07/2018 4.87 3.80 - 5.20 M/uL Final    HGB 12/07/2018 14.8  11.5 - 16.0 g/dL Final    HCT 12/07/2018 42.4  35.0 - 47.0 % Final    MCV 12/07/2018 87.1  80.0 - 99.0 FL Final    MCH 12/07/2018 30.4  26.0 - 34.0 PG Final    MCHC 12/07/2018 34.9  30.0 - 36.5 g/dL Final    RDW 12/07/2018 13.0  11.5 - 14.5 % Final    PLATELET 43/05/1001 206  150 - 400 K/uL Final    MPV 12/07/2018 9.7  8.9 - 12.9 FL Final    NRBC 12/07/2018 0.0  0  WBC Final    ABSOLUTE NRBC 12/07/2018 0.00  0.00 - 0.01 K/uL Final    NEUTROPHILS 12/07/2018 66  32 - 75 % Final    LYMPHOCYTES 12/07/2018 25  12 - 49 % Final    MONOCYTES 12/07/2018 8  5 - 13 % Final    EOSINOPHILS 12/07/2018 0  0 - 7 % Final    BASOPHILS 12/07/2018 0  0 - 1 % Final    IMMATURE GRANULOCYTES 12/07/2018 0  0.0 - 0.5 % Final    ABS. NEUTROPHILS 12/07/2018 3.6  1.8 - 8.0 K/UL Final    ABS. LYMPHOCYTES 12/07/2018 1.3  0.8 - 3.5 K/UL Final    ABS. MONOCYTES 12/07/2018 0.4  0.0 - 1.0 K/UL Final    ABS. EOSINOPHILS 12/07/2018 0.0  0.0 - 0.4 K/UL Final    ABS. BASOPHILS 12/07/2018 0.0  0.0 - 0.1 K/UL Final    ABS. IMM. GRANS. 12/07/2018 0.0  0.00 - 0.04 K/UL Final    DF 12/07/2018 AUTOMATED    Final       Review of Systems   Constitutional: Negative for malaise/fatigue. HENT: Negative for congestion. Eyes: Negative for blurred vision and pain. Respiratory: Negative for cough and shortness of breath. Cardiovascular: Positive for palpitations. Negative for chest pain. Gastrointestinal: Negative for abdominal pain and heartburn. Genitourinary: Negative for frequency and urgency. Musculoskeletal: Positive for back pain (low back). Negative for joint pain and myalgias. Neurological: Negative for dizziness, tingling, sensory change, weakness and headaches. Endo/Heme/Allergies: Negative for environmental allergies. Psychiatric/Behavioral: Negative for depression, memory loss and substance abuse. The patient has insomnia.       Visit Vitals  /85 (BP 1 Location: Left arm, BP Patient Position: Sitting)   Pulse 80   Temp 98.2 °F (36.8 °C) (Oral)   Resp 16   Ht 5' 4\" (1.626 m)   Wt 191 lb (86.6 kg)   SpO2 99%   BMI 32.79 kg/m²       Physical Exam   Constitutional: She is oriented to person, place, and time. She appears well-developed. No distress. Obese   HENT:   Right Ear: External ear normal.   Left Ear: External ear normal.   Mouth/Throat: Oropharynx is clear and moist. No oropharyngeal exudate. Eyes: Conjunctivae and EOM are normal. Right eye exhibits no discharge. Left eye exhibits no discharge. Neck: Normal range of motion. Neck supple. No thyromegaly present. Cardiovascular: Normal rate, regular rhythm and normal heart sounds. Pulses:       Dorsalis pedis pulses are 2+ on the right side, and 2+ on the left side. Pulmonary/Chest: Effort normal and breath sounds normal. She has no wheezes. Abdominal: Soft. Bowel sounds are normal. There is no tenderness. Lymphadenopathy:     She has no cervical adenopathy. Neurological: She is alert and oriented to person, place, and time. Reflex Scores:       Patellar reflexes are 2+ on the right side and 2+ on the left side. Skin: She is not diaphoretic. Psychiatric: She has a normal mood and affect. Her behavior is normal.   Nursing note and vitals reviewed. ASSESSMENT and PLAN    ICD-10-CM ICD-9-CM    1. Physical exam I01.83 U85.7 METABOLIC PANEL, COMPREHENSIVE      CBC W/O DIFF      LIPID PANEL      TSH 3RD GENERATION    Complete physical exam done. Basic fasting labs drawn. 2. Essential hypertension I10 401.9 carvedilol (COREG) 6.25 mg tablet sent to pharmacy. Carvedilol 6.25 mg prescribed, which she should take BID. Potential side effects were discussed. She should stop taking lisinopril-HCTZ. Patient should continue monitoring her BP and send me her readings via [employer]. I will increase her dosage if her BP continues to run high.    3. Need for hepatitis C screening test Z11.59 V73.89 HEPATITIS C AB    Hepatitis C screening ordered. 4. Colon cancer screening Z12.11 V76.51 REFERRAL TO GASTROENTEROLOGY    Referred to GI for colonoscopy. 5. Acute midline low back pain without sciatica M54.5 724.2 No medication give at this time as she is improving. She can take OTC ibuprofen as needed. Patient should not do any heavy lifting at this time and should take it easy at the gym. 6. Obesity (BMI 30.0-34. 9) E66.9 278.00 I recommend following a Mediterranean diet but she should watch her calorie consumption. . Encouraged her to maintain a healthy diet and exercise. If she hits a plateau, she can make an appointment and can provide assistance. This plan was reviewed with the patient and patient agrees. All questions were answered. This scribe documentation was reviewed by me and accurately reflects the examination and decisions made by me. This note will not be viewable in [address]

## 2019-01-16 LAB
ALBUMIN SERPL-MCNC: 4.3 G/DL (ref 3.5–5.5)
ALBUMIN/GLOB SERPL: 1.8 {RATIO} (ref 1.2–2.2)
ALP SERPL-CCNC: 74 IU/L (ref 39–117)
ALT SERPL-CCNC: 16 IU/L (ref 0–32)
AST SERPL-CCNC: 15 IU/L (ref 0–40)
BILIRUB SERPL-MCNC: 0.4 MG/DL (ref 0–1.2)
BUN SERPL-MCNC: 17 MG/DL (ref 6–24)
BUN/CREAT SERPL: 23 (ref 9–23)
CALCIUM SERPL-MCNC: 9.4 MG/DL (ref 8.7–10.2)
CHLORIDE SERPL-SCNC: 102 MMOL/L (ref 96–106)
CHOLEST SERPL-MCNC: 242 MG/DL (ref 100–199)
CO2 SERPL-SCNC: 24 MMOL/L (ref 20–29)
CREAT SERPL-MCNC: 0.73 MG/DL (ref 0.57–1)
ERYTHROCYTE [DISTWIDTH] IN BLOOD BY AUTOMATED COUNT: 14.3 % (ref 12.3–15.4)
GLOBULIN SER CALC-MCNC: 2.4 G/DL (ref 1.5–4.5)
GLUCOSE SERPL-MCNC: 89 MG/DL (ref 65–99)
HCT VFR BLD AUTO: 40.7 % (ref 34–46.6)
HCV AB S/CO SERPL IA: <0.1 S/CO RATIO (ref 0–0.9)
HDLC SERPL-MCNC: 59 MG/DL
HGB BLD-MCNC: 14.1 G/DL (ref 11.1–15.9)
LDLC SERPL CALC-MCNC: 137 MG/DL (ref 0–99)
MCH RBC QN AUTO: 30.2 PG (ref 26.6–33)
MCHC RBC AUTO-ENTMCNC: 34.6 G/DL (ref 31.5–35.7)
MCV RBC AUTO: 87 FL (ref 79–97)
PLATELET # BLD AUTO: 321 X10E3/UL (ref 150–379)
POTASSIUM SERPL-SCNC: 4.5 MMOL/L (ref 3.5–5.2)
PROT SERPL-MCNC: 6.7 G/DL (ref 6–8.5)
RBC # BLD AUTO: 4.67 X10E6/UL (ref 3.77–5.28)
SODIUM SERPL-SCNC: 141 MMOL/L (ref 134–144)
TRIGL SERPL-MCNC: 230 MG/DL (ref 0–149)
TSH SERPL DL<=0.005 MIU/L-ACNC: 2.23 UIU/ML (ref 0.45–4.5)
VLDLC SERPL CALC-MCNC: 46 MG/DL (ref 5–40)
WBC # BLD AUTO: 3.7 X10E3/UL (ref 3.4–10.8)

## 2019-01-17 DIAGNOSIS — I10 ESSENTIAL HYPERTENSION: Primary | ICD-10-CM

## 2019-01-17 RX ORDER — AMLODIPINE BESYLATE 5 MG/1
5 TABLET ORAL DAILY
Qty: 30 TAB | Refills: 0 | Status: SHIPPED | OUTPATIENT
Start: 2019-01-17 | End: 2019-02-16

## 2019-01-18 NOTE — PROGRESS NOTES
Reggie Douglas, your cholesterol came back high. I would recommend going on a low carb diet & exercise as we had discussed during an appointment. Repeat labs in 3 months.

## 2019-04-05 ENCOUNTER — PATIENT MESSAGE (OUTPATIENT)
Dept: PRIMARY CARE CLINIC | Age: 54
End: 2019-04-05

## 2019-04-05 RX ORDER — LISINOPRIL AND HYDROCHLOROTHIAZIDE 10; 12.5 MG/1; MG/1
1 TABLET ORAL DAILY
Qty: 90 TAB | Refills: 1 | Status: SHIPPED | OUTPATIENT
Start: 2019-04-05 | End: 2019-07-04

## 2019-04-09 ENCOUNTER — OFFICE VISIT (OUTPATIENT)
Dept: PRIMARY CARE CLINIC | Age: 54
End: 2019-04-09

## 2019-04-09 VITALS
BODY MASS INDEX: 32.1 KG/M2 | DIASTOLIC BLOOD PRESSURE: 83 MMHG | TEMPERATURE: 98.7 F | HEIGHT: 64 IN | SYSTOLIC BLOOD PRESSURE: 128 MMHG | HEART RATE: 100 BPM | RESPIRATION RATE: 16 BRPM | WEIGHT: 188 LBS | OXYGEN SATURATION: 100 %

## 2019-04-09 DIAGNOSIS — I10 ESSENTIAL HYPERTENSION: Primary | ICD-10-CM

## 2019-04-09 DIAGNOSIS — E66.9 OBESITY (BMI 30.0-34.9): ICD-10-CM

## 2019-04-09 DIAGNOSIS — N95.1 HOT FLASHES DUE TO MENOPAUSE: ICD-10-CM

## 2019-04-09 RX ORDER — PHENTERMINE HYDROCHLORIDE 37.5 MG/1
37.5 TABLET ORAL
Qty: 14 TAB | Refills: 0 | Status: SHIPPED | OUTPATIENT
Start: 2019-04-09 | End: 2019-04-23

## 2019-04-09 NOTE — PROGRESS NOTES
Written by Karen Ching, as dictated by Dr. Anita Henning MD.    Keyla Villeda is a 48 y.o. female. HPI  The patient presents today for a HTN follow-up. BP is good today at 128/83. Compliant on lisinopril-HCTZ 10-12.5 mg. She weighs 188 lbs today and has lost weight from 191 lbs on 01/15/2019. Patient fasts from 7 pm to 12 pm and tries to follow a healthy diet. She works out 3 times per week with a  and walks her dogs 5 times per week. The patient reports that she has cravings for foods. Patient took fen-phen in the past but has not tried phentermine. It has been a few years since she tried Weight Watchers, noting she lost a few lbs and then hit a plateau. Patient has been taking progesterone 100 mg and using Premarin vaginal cream daily for hot flashes, per OB/GYN. She notes that her hormone levels are lower than normal menopausal levels. Current Outpatient Medications on File Prior to Visit   Medication Sig Dispense Refill    lisinopril-hydroCHLOROthiazide (PRINZIDE, ZESTORETIC) 10-12.5 mg per tablet Take 1 Tab by mouth daily for 90 days. 90 Tab 1    multivitamin (ONE A DAY) tablet Take 1 Tab by mouth daily.  MAGNESIUM GLUCONATE, BULK, by Does Not Apply route.  progesterone (PROMETRIUM) 100 mg capsule Take 100 mg by mouth daily.  conjugated estrogens (PREMARIN) 0.625 mg/gram vaginal cream Insert 0.5 g into vagina daily. No current facility-administered medications on file prior to visit.         Past Medical History:   Diagnosis Date    Hypertension        Past Surgical History:   Procedure Laterality Date    HX HEENT  12/20/2018    eye lid surgery       Family History   Problem Relation Age of Onset    Hypertension Father        Social History     Socioeconomic History    Marital status: SINGLE     Spouse name: Not on file    Number of children: Not on file    Years of education: Not on file    Highest education level: Not on file   Occupational History    Not on file   Social Needs    Financial resource strain: Not on file    Food insecurity:     Worry: Not on file     Inability: Not on file    Transportation needs:     Medical: Not on file     Non-medical: Not on file   Tobacco Use    Smoking status: Never Smoker    Smokeless tobacco: Never Used   Substance and Sexual Activity    Alcohol use: No     Frequency: Never    Drug use: No    Sexual activity: Yes     Partners: Male     Birth control/protection: None   Lifestyle    Physical activity:     Days per week: Not on file     Minutes per session: Not on file    Stress: Not on file   Relationships    Social connections:     Talks on phone: Not on file     Gets together: Not on file     Attends [employer]ist service: Not on file     Active member of club or organization: Not on file     Attends meetings of clubs or organizations: Not on file     Relationship status: Not on file    Intimate partner violence:     Fear of current or ex partner: Not on file     Emotionally abused: Not on file     Physically abused: Not on file     Forced sexual activity: Not on file   Other Topics Concern    Not on file   Social History Narrative    Not on file       Office Visit on 01/15/2019   Component Date Value Ref Range Status    Glucose 01/15/2019 89  65 - 99 mg/dL Final    BUN 01/15/2019 17  6 - 24 mg/dL Final    Creatinine 01/15/2019 0.73  0.57 - 1.00 mg/dL Final    GFR est non-AA 01/15/2019 94  >59 mL/min/1.73 Final    GFR est AA 01/15/2019 109  >59 mL/min/1.73 Final    BUN/Creatinine ratio 01/15/2019 23  9 - 23 Final    Sodium 01/15/2019 141  134 - 144 mmol/L Final    Potassium 01/15/2019 4.5  3.5 - 5.2 mmol/L Final    Chloride 01/15/2019 102  96 - 106 mmol/L Final    CO2 01/15/2019 24  20 - 29 mmol/L Final    Calcium 01/15/2019 9.4  8.7 - 10.2 mg/dL Final    Protein, total 01/15/2019 6.7  6.0 - 8.5 g/dL Final    Albumin 01/15/2019 4.3  3.5 - 5.5 g/dL Final    GLOBULIN, TOTAL 01/15/2019 2.4  1.5 - 4.5 g/dL Final    A-G Ratio 01/15/2019 1.8  1.2 - 2.2 Final    Bilirubin, total 01/15/2019 0.4  0.0 - 1.2 mg/dL Final    Alk. phosphatase 01/15/2019 74  39 - 117 IU/L Final    AST (SGOT) 01/15/2019 15  0 - 40 IU/L Final    ALT (SGPT) 01/15/2019 16  0 - 32 IU/L Final    WBC 01/15/2019 3.7  3.4 - 10.8 x10E3/uL Final    RBC 01/15/2019 4.67  3.77 - 5.28 x10E6/uL Final    HGB 01/15/2019 14.1  11.1 - 15.9 g/dL Final    HCT 01/15/2019 40.7  34.0 - 46.6 % Final    MCV 01/15/2019 87  79 - 97 fL Final    MCH 01/15/2019 30.2  26.6 - 33.0 pg Final    MCHC 01/15/2019 34.6  31.5 - 35.7 g/dL Final    RDW 01/15/2019 14.3  12.3 - 15.4 % Final    PLATELET 87/25/8254 206  150 - 379 x10E3/uL Final    Cholesterol, total 01/15/2019 242* 100 - 199 mg/dL Final    Triglyceride 01/15/2019 230* 0 - 149 mg/dL Final    HDL Cholesterol 01/15/2019 59  >39 mg/dL Final    VLDL, calculated 01/15/2019 46* 5 - 40 mg/dL Final    LDL, calculated 01/15/2019 137* 0 - 99 mg/dL Final    TSH 01/15/2019 2.230  0.450 - 4.500 uIU/mL Final    Hep C Virus Ab 01/15/2019 <0.1  0.0 - 0.9 s/co ratio Final       Review of Systems   Constitutional: Positive for diaphoresis (improved on premarin and progesterone) and weight loss (intentional). Negative for malaise/fatigue. Respiratory: Negative for cough and shortness of breath. Cardiovascular: Negative for chest pain and palpitations. Musculoskeletal: Negative for joint pain and myalgias. Neurological: Negative for dizziness, tingling, sensory change, weakness and headaches. Psychiatric/Behavioral: Negative for depression, memory loss and substance abuse. Visit Vitals  /83 (BP 1 Location: Left arm, BP Patient Position: Sitting)   Pulse 100   Temp 98.7 °F (37.1 °C) (Oral)   Resp 16   Ht 5' 4\" (1.626 m)   Wt 188 lb (85.3 kg)   SpO2 100%   BMI 32.27 kg/m²       Physical Exam   Constitutional: She is oriented to person, place, and time.  She appears well-developed. No distress. Obese   HENT:   Right Ear: External ear normal.   Left Ear: External ear normal.   Eyes: Conjunctivae and EOM are normal. Right eye exhibits no discharge. Left eye exhibits no discharge. Neck: Normal range of motion. Neck supple. Cardiovascular: Normal rate and regular rhythm. Pulmonary/Chest: Effort normal and breath sounds normal. She has no wheezes. Abdominal: Soft. Bowel sounds are normal. There is no tenderness. Lymphadenopathy:     She has no cervical adenopathy. Neurological: She is alert and oriented to person, place, and time. Skin: She is not diaphoretic. Psychiatric: She has a normal mood and affect. Her behavior is normal.   Nursing note and vitals reviewed. ASSESSMENT and PLAN    ICD-10-CM ICD-9-CM    1. Essential hypertension I10 401.9 BP is well-controlled on current medication. No change to dosage at this time. She should continue lisinopril-HCTZ 10-12.5 mg.   2. Obesity (BMI 30.0-34. 9) E66.9 278.00 phentermine (ADIPEX-P) 37.5 mg tablet script given to patient. Phentermine 37.5 mg prescribed x 2 weeks. Potential side effects were discussed. She should start with 1/2 tab PO in the morning. If she feels hungry in the afternoon, she should increase to 1 tab in the morning. EKG will be performed at 2 week follow-up. No history of glaucoma. If she has palpitations, she should let me know. If she does not lose 5-6 lbs in 2 weeks, I cannot continue prescribing phentermine. Advised resistance exercises twice per week and more focus on cardio exercise. After she loses 20-30 lbs, she can increase resistance exercises. I recommend the patient download the Weight Watchers arun to help track food consumption. The patient should not use their weekly points, as weekly points are for weight maintenance and the patient is trying to lose weight. The patient should eat plenty of fruits and vegetables.    3. Hot flashes due to menopause N95.1 627.2 Advised pt to use premarin and take progesterone a few times per week as progesterone contributes to weight gain. Followed by OB/GYN. This plan was reviewed with the patient and patient agrees. All questions were answered. This scribe documentation was reviewed by me and accurately reflects the examination and decisions made by me. This note will not be viewable in [address]

## 2019-04-09 NOTE — PROGRESS NOTES
Chief Complaint   Patient presents with    Hypertension     Visit Vitals  /83 (BP 1 Location: Left arm, BP Patient Position: Sitting)   Pulse 100   Temp 98.7 °F (37.1 °C) (Oral)   Resp 16   Ht 5' 4\" (1.626 m)   Wt 188 lb (85.3 kg)   SpO2 100%   BMI 32.27 kg/m²     1. Have you been to the ER, urgent care clinic since your last visit? Hospitalized since your last visit? No    2. Have you seen or consulted any other health care providers outside of the [address] [place] [address] since your last visit? Include any pap smears or colon screening.  No

## 2019-05-06 ENCOUNTER — OFFICE VISIT (OUTPATIENT)
Dept: PRIMARY CARE CLINIC | Age: 54
End: 2019-05-06

## 2019-05-06 VITALS
HEART RATE: 80 BPM | TEMPERATURE: 97.4 F | SYSTOLIC BLOOD PRESSURE: 133 MMHG | OXYGEN SATURATION: 100 % | HEIGHT: 64 IN | RESPIRATION RATE: 16 BRPM | WEIGHT: 188.2 LBS | BODY MASS INDEX: 32.13 KG/M2 | DIASTOLIC BLOOD PRESSURE: 86 MMHG

## 2019-05-06 DIAGNOSIS — Z79.899 MEDICATION MANAGEMENT: ICD-10-CM

## 2019-05-06 DIAGNOSIS — I10 ESSENTIAL HYPERTENSION: Primary | ICD-10-CM

## 2019-05-06 DIAGNOSIS — E66.9 OBESITY (BMI 30-39.9): ICD-10-CM

## 2019-05-06 NOTE — PROGRESS NOTES
Written by Olinda Diana, as dictated by Dr. Nura Rojas MD.    Mesha Guardado is a 48 y.o. female. HPI  The patient presents today for a weight loss follow-up and medication evaluation. She weighs 188 lbs today her weight has not changed from 04/09/2019. Patient was prescribed phentermine 37.5 mg on 04/09/2019. She reports that phentermine caused her to be hungry and did not help to control her emotional eating. The pt notes that prior to starting phentermine she went on a cruise and gained some weight. Phentermine kept her up at night and she had to take benadryl to sleep. She reports that last weeks she joined Weight Watchers, but notes that she was going over her daily points. Pt does not eat fast food, but admits that she is not active enought. The patient recently purchased a rowing machine and notes that she works out with a  3 days/week. Patient has not been using a FitBit, but notes that in the past she got 7,000-7,500 steps on an average day. Pt notes that she tried Contrave in the past but it caused her to feel depressed. She has not tried [place] or [employer] Current Outpatient Medications on File Prior to Visit   Medication Sig Dispense Refill    lisinopril-hydroCHLOROthiazide (PRINZIDE, ZESTORETIC) 10-12.5 mg per tablet Take 1 Tab by mouth daily for 90 days. 90 Tab 1    multivitamin (ONE A DAY) tablet Take 1 Tab by mouth daily.  MAGNESIUM GLUCONATE, BULK, by Does Not Apply route.  progesterone (PROMETRIUM) 100 mg capsule Take 100 mg by mouth daily.  conjugated estrogens (PREMARIN) 0.625 mg/gram vaginal cream Insert 0.5 g into vagina daily. No current facility-administered medications on file prior to visit.         Past Medical History:   Diagnosis Date    Hypertension        Past Surgical History:   Procedure Laterality Date    HX HEENT  12/20/2018    eye lid surgery       Family History   Problem Relation Age of Onset    Hypertension Father        Social History     Socioeconomic History    Marital status: SINGLE     Spouse name: Not on file    Number of children: Not on file    Years of education: Not on file    Highest education level: Not on file   Occupational History    Not on file   Social Needs    Financial resource strain: Not on file    Food insecurity:     Worry: Not on file     Inability: Not on file    Transportation needs:     Medical: Not on file     Non-medical: Not on file   Tobacco Use    Smoking status: Never Smoker    Smokeless tobacco: Never Used   Substance and Sexual Activity    Alcohol use: No     Frequency: Never    Drug use: No    Sexual activity: Yes     Partners: Male     Birth control/protection: None   Lifestyle    Physical activity:     Days per week: Not on file     Minutes per session: Not on file    Stress: Not on file   Relationships    Social connections:     Talks on phone: Not on file     Gets together: Not on file     Attends Jehovah's witness service: Not on file     Active member of club or organization: Not on file     Attends meetings of clubs or organizations: Not on file     Relationship status: Not on file    Intimate partner violence:     Fear of current or ex partner: Not on file     Emotionally abused: Not on file     Physically abused: Not on file     Forced sexual activity: Not on file   Other Topics Concern    Not on file   Social History Narrative    Not on file       Review of Systems   Constitutional: Negative for malaise/fatigue and weight loss. Respiratory: Negative for cough and shortness of breath. Cardiovascular: Negative for chest pain and palpitations. Musculoskeletal: Negative for joint pain and myalgias. Neurological: Negative for dizziness, tingling, sensory change, weakness and headaches. Psychiatric/Behavioral: Negative for depression, memory loss and substance abuse.      Visit Vitals  /86 (BP 1 Location: Left arm, BP Patient Position: Sitting)   Pulse 80   Temp 97.4 °F (36.3 °C) (Oral)   Resp 16   Ht 5' 4\" (1.626 m)   Wt 188 lb 3.2 oz (85.4 kg)   SpO2 100%   BMI 32.30 kg/m²       Physical Exam   Constitutional: She is oriented to person, place, and time. She appears well-developed. No distress. Obese   HENT:   Right Ear: External ear normal.   Left Ear: External ear normal.   Eyes: Conjunctivae and EOM are normal. Right eye exhibits no discharge. Left eye exhibits no discharge. Neck: Normal range of motion. Neck supple. Cardiovascular: Normal rate and regular rhythm. Pulmonary/Chest: Effort normal and breath sounds normal. She has no wheezes. Abdominal: Soft. Bowel sounds are normal. There is no tenderness. Lymphadenopathy:     She has no cervical adenopathy. Neurological: She is alert and oriented to person, place, and time. Skin: She is not diaphoretic. Psychiatric: She has a normal mood and affect. Her behavior is normal.   Nursing note and vitals reviewed. ASSESSMENT and PLAN    ICD-10-CM ICD-9-CM    1. Essential hypertension I10 401.9 BP is well-controlled on current medication. No change to dosage at this time. 2. Obesity (BMI 30-39. 9) E66.9 278.00 liraglutide (SAXENDA) 3 mg/0.5 mL (18 mg/3 mL) pen sent to pharmacy. Saxenda prescribed. Potential side effects were discussed. Coupon card given to patient. She should inject 0.5 mL daily. Encouraged patient to continue Weight Watchers and increase exercise. 3. Medication management Z79.899 V58.69 AMB POC EKG ROUTINE W/ 12 LEADS, INTER & REP    EKG in office Sinus Rhythm, Low voltage in precordial leads. This plan was reviewed with the patient and patient agrees. All questions were answered. This scribe documentation was reviewed by me and accurately reflects the examination and decisions made by me. This note will not be viewable in [address]

## 2019-05-06 NOTE — PROGRESS NOTES
Chief Complaint   Patient presents with    Medication Evaluation     Visit Vitals  /86 (BP 1 Location: Left arm, BP Patient Position: Sitting)   Pulse 80   Temp 97.4 °F (36.3 °C) (Oral)   Resp 16   Ht 5' 4\" (1.626 m)   Wt 188 lb 3.2 oz (85.4 kg)   SpO2 100%   BMI 32.30 kg/m²     1. Have you been to the ER, urgent care clinic since your last visit? Hospitalized since your last visit? No    2. Have you seen or consulted any other health care providers outside of the [address] [place] [address] since your last visit? Include any pap smears or colon screening.  No

## 2019-05-07 ENCOUNTER — TELEPHONE (OUTPATIENT)
Dept: PRIMARY CARE CLINIC | Age: 54
End: 2019-05-07

## 2019-09-30 ENCOUNTER — TELEPHONE (OUTPATIENT)
Dept: PRIMARY CARE CLINIC | Age: 54
End: 2019-09-30

## 2019-09-30 DIAGNOSIS — I10 ESSENTIAL HYPERTENSION: Primary | ICD-10-CM

## 2019-09-30 RX ORDER — LISINOPRIL AND HYDROCHLOROTHIAZIDE 10; 12.5 MG/1; MG/1
1 TABLET ORAL DAILY
Qty: 30 TAB | Refills: 0 | Status: SHIPPED | OUTPATIENT
Start: 2019-09-30 | End: 2019-10-28 | Stop reason: SDUPTHER

## 2019-09-30 NOTE — TELEPHONE ENCOUNTER
From: Nate Pabon  Sent: 9/30/2019 11:48 AM EDT  Subject: Medication Renewal Request    Nate Pabon would like a refill of the following medications: Other - Lisinopril/hydrochlorot completly out. Jessie Henderson apparently called to refill but was told that it wasn't approved?     Preferred pharmacy: [facility] [address] [place] [address], [address] [place] [address]

## 2019-10-28 DIAGNOSIS — I10 ESSENTIAL HYPERTENSION: ICD-10-CM

## 2019-10-28 RX ORDER — LISINOPRIL AND HYDROCHLOROTHIAZIDE 10; 12.5 MG/1; MG/1
1 TABLET ORAL DAILY
Qty: 90 TAB | Refills: 0 | Status: SHIPPED | OUTPATIENT
Start: 2019-10-28 | End: 2019-11-27

## 2019-12-16 DIAGNOSIS — E66.9 OBESITY (BMI 30-39.9): Primary | ICD-10-CM

## 2020-01-14 ENCOUNTER — DOCUMENTATION ONLY (OUTPATIENT)
Dept: PRIMARY CARE CLINIC | Age: 55
End: 2020-01-14

## 2020-01-23 ENCOUNTER — DOCUMENTATION ONLY (OUTPATIENT)
Dept: PRIMARY CARE CLINIC | Age: 55
End: 2020-01-23

## 2020-01-23 NOTE — PROGRESS NOTES
Renetta Hutchins from [employer] submitted prior [facility] has been [employer] [id number]

## 2020-01-28 ENCOUNTER — PATIENT MESSAGE (OUTPATIENT)
Dept: PRIMARY CARE CLINIC | Age: 55
End: 2020-01-28

## 2020-01-28 RX ORDER — LISINOPRIL AND HYDROCHLOROTHIAZIDE 10; 12.5 MG/1; MG/1
1 TABLET ORAL DAILY
Qty: 15 TAB | Refills: 0 | Status: SHIPPED | OUTPATIENT
Start: 2020-01-28 | End: 2020-02-10 | Stop reason: SDUPTHER

## 2020-01-28 NOTE — TELEPHONE ENCOUNTER
LOV: 05/06/2019  Labs: 01/15/2019  Refill:10/28/2019  Next Appt: TBD  Note to pharmacy: Patient needs an appointment, advised on previous refill request, please have patient call the office and schedule. Thank you.

## 2020-01-28 NOTE — TELEPHONE ENCOUNTER
From: Meka Lombardo  To: Milli Mason MD  Sent: 1/28/2020 10:20 AM EST  Subject: Prescription Question    Hi! I need a refill for my Lisinopril/hctz prescription. Please call in to [facility]. Thank you!     Mary Lou Bright

## 2020-02-03 ENCOUNTER — DOCUMENTATION ONLY (OUTPATIENT)
Dept: PRIMARY CARE CLINIC | Age: 55
End: 2020-02-03

## 2020-02-10 ENCOUNTER — OFFICE VISIT (OUTPATIENT)
Dept: PRIMARY CARE CLINIC | Age: 55
End: 2020-02-10

## 2020-02-10 VITALS
WEIGHT: 191 LBS | OXYGEN SATURATION: 100 % | HEART RATE: 78 BPM | BODY MASS INDEX: 32.61 KG/M2 | RESPIRATION RATE: 16 BRPM | DIASTOLIC BLOOD PRESSURE: 89 MMHG | TEMPERATURE: 98.1 F | SYSTOLIC BLOOD PRESSURE: 123 MMHG | HEIGHT: 64 IN

## 2020-02-10 DIAGNOSIS — Z12.11 COLON CANCER SCREENING: ICD-10-CM

## 2020-02-10 DIAGNOSIS — I10 ESSENTIAL HYPERTENSION: ICD-10-CM

## 2020-02-10 DIAGNOSIS — E66.9 OBESITY (BMI 30.0-34.9): ICD-10-CM

## 2020-02-10 DIAGNOSIS — Z12.39 BREAST CANCER SCREENING: ICD-10-CM

## 2020-02-10 DIAGNOSIS — Z00.00 PHYSICAL EXAM: Primary | ICD-10-CM

## 2020-02-10 RX ORDER — LISINOPRIL AND HYDROCHLOROTHIAZIDE 10; 12.5 MG/1; MG/1
1 TABLET ORAL DAILY
Qty: 90 TAB | Refills: 2 | Status: SHIPPED | OUTPATIENT
Start: 2020-02-10 | End: 2020-11-13

## 2020-02-10 RX ORDER — LISINOPRIL AND HYDROCHLOROTHIAZIDE 10; 12.5 MG/1; MG/1
1 TABLET ORAL DAILY
Qty: 15 TAB | Refills: 0 | Status: CANCELLED | OUTPATIENT
Start: 2020-02-10 | End: 2020-02-25

## 2020-02-10 NOTE — PROGRESS NOTES
Written by Destinee Harding, as dictated by Dr. Kushal Lovelace MD.    Kai Salazar is a 47 y.o. female. HPI  The patient comes in today for a complete physical examination. Patient is fasting for labs. She is taking Vitamin D. Denies allergy or cold symptoms, heartburns, palpitations,  issues and back pain. She has gained weight. She weighs 191 lbs today and weighed 188 lbs on 05/06/19. She is working on weight loss with a program. She does intermittent fasting, with low carbohydrates diet per the recommendations of the program. She also work out 30 minutes a day. BP in office looks good today. Compliant on Lisinopril-HCTZ 10-12.5 mg. She notes her BP at home is usually normal.    She has been sleeping well. Denies snoring or headaches. She sees a gynecologist for her pap smear and breast examination. She is due for her mammogram, as it was last done 2 years ago. Her last pap smear was about 1 year ago. She has never had a colonoscopy, but is interested in doing Cologuard rather than a colonoscopy. She would prefer to not get the Shingrix vaccine at this time. Patient Active Problem List   Diagnosis Code    Essential hypertension I10        Current Outpatient Medications on File Prior to Visit   Medication Sig Dispense Refill    multivitamin (ONE A DAY) tablet Take 1 Tab by mouth daily.  MAGNESIUM GLUCONATE, BULK, by Does Not Apply route.  progesterone (PROMETRIUM) 100 mg capsule Take 100 mg by mouth daily.  conjugated estrogens (PREMARIN) 0.625 mg/gram vaginal cream Insert 0.5 g into vagina daily.  lisinopril-hydroCHLOROthiazide (PRINZIDE, ZESTORETIC) 10-12.5 mg per tablet Take 1 Tab by mouth daily for 15 days. 15 Tab 0     No current facility-administered medications on file prior to visit.         No Known Allergies    Past Medical History:   Diagnosis Date    Hypertension        Past Surgical History:   Procedure Laterality Date    HX HEENT  12/20/2018    eye lid surgery       Family History   Problem Relation Age of Onset    Hypertension Father        Social History     Socioeconomic History    Marital status: SINGLE     Spouse name: Not on file    Number of children: Not on file    Years of education: Not on file    Highest education level: Not on file   Occupational History    Not on file   Social Needs    Financial resource strain: Not on file    Food insecurity:     Worry: Not on file     Inability: Not on file    Transportation needs:     Medical: Not on file     Non-medical: Not on file   Tobacco Use    Smoking status: Never Smoker    Smokeless tobacco: Never Used   Substance and Sexual Activity    Alcohol use: No     Frequency: Never    Drug use: No    Sexual activity: Yes     Partners: Male     Birth control/protection: None   Lifestyle    Physical activity:     Days per week: Not on file     Minutes per session: Not on file    Stress: Not on file   Relationships    Social connections:     Talks on phone: Not on file     Gets together: Not on file     Attends Restorationism service: Not on file     Active member of club or organization: Not on file     Attends meetings of clubs or organizations: Not on file     Relationship status: Not on file    Intimate partner violence:     Fear of current or ex partner: Not on file     Emotionally abused: Not on file     Physically abused: Not on file     Forced sexual activity: Not on file   Other Topics Concern    Not on file   Social History Narrative    Not on file       Review of Systems   Constitutional: Negative for malaise/fatigue and weight loss. HENT: Negative for congestion and hearing loss. Eyes: Negative for blurred vision and photophobia. Respiratory: Negative for cough and shortness of breath. Cardiovascular: Negative for chest pain and leg swelling. Gastrointestinal: Negative for constipation, diarrhea and heartburn.    Genitourinary: Negative for dysuria, frequency and urgency. Musculoskeletal: Negative for joint pain and myalgias. Neurological: Negative for dizziness and headaches. Psychiatric/Behavioral: Negative for depression and substance abuse. The patient is not nervous/anxious and does not have insomnia. Visit Vitals  /89 (BP 1 Location: Left arm, BP Patient Position: Sitting)   Pulse 78   Temp 98.1 °F (36.7 °C) (Oral)   Resp 16   Ht 5' 4\" (1.626 m)   Wt 191 lb (86.6 kg)   SpO2 100%   BMI 32.79 kg/m²       Physical Exam  Vitals signs and nursing note reviewed. Constitutional:       General: She is not in acute distress. Appearance: Normal appearance. She is well-developed and well-groomed. She is obese. She is not diaphoretic. HENT:      Head: Normocephalic and atraumatic. Right Ear: Tympanic membrane, ear canal and external ear normal.      Left Ear: Tympanic membrane, ear canal and external ear normal.      Nose:      Right Sinus: No maxillary sinus tenderness. Left Sinus: No maxillary sinus tenderness. Mouth/Throat:      Mouth: Mucous membranes are moist.      Pharynx: Oropharynx is clear. Eyes:      General:         Right eye: No discharge. Left eye: No discharge. Conjunctiva/sclera: Conjunctivae normal.      Pupils: Pupils are equal, round, and reactive to light. Neck:      Musculoskeletal: Normal range of motion and neck supple. Thyroid: No thyromegaly. Cardiovascular:      Rate and Rhythm: Normal rate and regular rhythm. Pulses: Normal pulses. Dorsalis pedis pulses are 2+ on the right side and 2+ on the left side. Heart sounds: Normal heart sounds. No murmur. No friction rub. No gallop. Pulmonary:      Effort: Pulmonary effort is normal.      Breath sounds: Normal breath sounds. No decreased breath sounds or wheezing. Abdominal:      General: Bowel sounds are normal. There is no distension. Palpations: Abdomen is soft. Tenderness:  There is no abdominal tenderness. Musculoskeletal: Normal range of motion. Comments: + BL knees without crepitus   Lymphadenopathy:      Cervical: No cervical adenopathy. Neurological:      Mental Status: She is alert and oriented to person, place, and time. Deep Tendon Reflexes: Reflexes are normal and symmetric. Reflex Scores:       Patellar reflexes are 2+ on the right side and 2+ on the left side. Psychiatric:         Behavior: Behavior normal.         Thought Content: Thought content normal.         ASSESSMENT and PLAN    ICD-10-CM ICD-9-CM    1. Physical exam Z00.00 V70.9 LIPID PANEL      CBC W/O DIFF      TSH 3RD GENERATION      METABOLIC PANEL, COMPREHENSIVE    Complete physical exam done. Basic labs drawn. 2. Essential hypertension I10 401.9 lisinopril-hydroCHLOROthiazide (PRINZIDE, ZESTORETIC) 10-12.5 mg per tablet sent to pharmacy. Lisinopril-HCTZ 10-12.5 mg refilled. BP is well-controlled on current medication. No change to dosage at this time. 3. Obesity (BMI 30.0-34. 9) E66.9 278.00 Encouraged diet and exercise. 4. Breast cancer screening Z12.39 V76.10 SHARRON 3D SAPNA W MAMMO BI SCREENING INCL CAD    3 D Mammogram ordered. 5. Colon cancer screening Z12.11 V76.51 COLOGUARD TEST (FECAL DNA COLORECTAL CANCER SCREENING)    Cologuard Test ordered. This plan was reviewed with the patient and patient agrees. All questions were answered. This scribe documentation was reviewed by me and accurately reflects the examination and decisions made by me. This note will not be viewable in [address]

## 2020-02-10 NOTE — PROGRESS NOTES
Chief Complaint   Patient presents with    Complete Physical     has obgyn and is fasting and blood pressure is a issue

## 2020-02-11 LAB
ALBUMIN SERPL-MCNC: 4.7 G/DL (ref 3.8–4.9)
ALBUMIN/GLOB SERPL: 2.4 {RATIO} (ref 1.2–2.2)
ALP SERPL-CCNC: 61 IU/L (ref 39–117)
ALT SERPL-CCNC: 20 IU/L (ref 0–32)
AST SERPL-CCNC: 23 IU/L (ref 0–40)
BILIRUB SERPL-MCNC: 0.4 MG/DL (ref 0–1.2)
BUN SERPL-MCNC: 11 MG/DL (ref 6–24)
BUN/CREAT SERPL: 13 (ref 9–23)
CALCIUM SERPL-MCNC: 9.7 MG/DL (ref 8.7–10.2)
CHLORIDE SERPL-SCNC: 96 MMOL/L (ref 96–106)
CHOLEST SERPL-MCNC: 245 MG/DL (ref 100–199)
CO2 SERPL-SCNC: 22 MMOL/L (ref 20–29)
CREAT SERPL-MCNC: 0.84 MG/DL (ref 0.57–1)
ERYTHROCYTE [DISTWIDTH] IN BLOOD BY AUTOMATED COUNT: 12.8 % (ref 11.7–15.4)
GLOBULIN SER CALC-MCNC: 2 G/DL (ref 1.5–4.5)
GLUCOSE SERPL-MCNC: 92 MG/DL (ref 65–99)
HCT VFR BLD AUTO: 45 % (ref 34–46.6)
HDLC SERPL-MCNC: 61 MG/DL
HGB BLD-MCNC: 14.8 G/DL (ref 11.1–15.9)
LDLC SERPL CALC-MCNC: 144 MG/DL (ref 0–99)
MCH RBC QN AUTO: 30.1 PG (ref 26.6–33)
MCHC RBC AUTO-ENTMCNC: 32.9 G/DL (ref 31.5–35.7)
MCV RBC AUTO: 92 FL (ref 79–97)
PLATELET # BLD AUTO: 365 X10E3/UL (ref 150–450)
POTASSIUM SERPL-SCNC: 4.5 MMOL/L (ref 3.5–5.2)
PROT SERPL-MCNC: 6.7 G/DL (ref 6–8.5)
RBC # BLD AUTO: 4.92 X10E6/UL (ref 3.77–5.28)
SODIUM SERPL-SCNC: 138 MMOL/L (ref 134–144)
TRIGL SERPL-MCNC: 202 MG/DL (ref 0–149)
TSH SERPL DL<=0.005 MIU/L-ACNC: 2.11 UIU/ML (ref 0.45–4.5)
VLDLC SERPL CALC-MCNC: 40 MG/DL (ref 5–40)
WBC # BLD AUTO: 4.2 X10E3/UL (ref 3.4–10.8)

## 2020-02-12 ENCOUNTER — HOSPITAL ENCOUNTER (OUTPATIENT)
Dept: MAMMOGRAPHY | Age: 55
Discharge: HOME | End: 2020-02-12
Attending: INTERNAL MEDICINE
Payer: COMMERCIAL

## 2020-02-12 ENCOUNTER — TELEPHONE (OUTPATIENT)
Dept: PRIMARY CARE CLINIC | Age: 55
End: 2020-02-12

## 2020-02-12 DIAGNOSIS — Z12.39 BREAST CANCER SCREENING: ICD-10-CM

## 2020-02-12 PROCEDURE — 77063 BREAST TOMOSYNTHESIS BI: CPT

## 2020-02-12 NOTE — PROGRESS NOTES
Charmian Krabbe all is well! Your blood report came back fine except cholesterol numbers came high. I would suggest cutting carbs & fried food. Exercise ( cardio 3-4 times ) a week.

## 2020-02-12 NOTE — TELEPHONE ENCOUNTER
I submitted PA yesterday not knowing that there was another PA on file and it asked me to f/u with [employer]. I have called Zeny Young at [facility] and we both figured out there are clinical questions on the other PA that was done 2/10/2020. I have completed the questionnaire and we are currently waiting on [employer] to respond back with a determination.

## 2020-02-12 NOTE — TELEPHONE ENCOUNTER
Audrey from cover my meds needs additional information for prior auth.     Rx: Saxenda    Reference Key: [id number]

## 2020-03-25 ENCOUNTER — TELEPHONE (OUTPATIENT)
Dept: PRIMARY CARE CLINIC | Age: 55
End: 2020-03-25

## 2020-04-08 ENCOUNTER — DOCUMENTATION ONLY (OUTPATIENT)
Dept: PRIMARY CARE CLINIC | Age: 55
End: 2020-04-08

## 2020-04-15 ENCOUNTER — TELEPHONE (OUTPATIENT)
Dept: PRIMARY CARE CLINIC | Age: 55
End: 2020-04-15

## 2020-04-15 NOTE — TELEPHONE ENCOUNTER
Followed up with prior authorization request KEY: [id number] for Saxenda 18MG/3ML pen-injectors  Notification per cover my meds that the case has been cancelled because this is not covered by the plan. Call placed to patient to inform of pa results.  Left voice message to return call to office

## 2020-11-13 DIAGNOSIS — I10 ESSENTIAL HYPERTENSION: ICD-10-CM

## 2020-11-13 RX ORDER — LISINOPRIL AND HYDROCHLOROTHIAZIDE 10; 12.5 MG/1; MG/1
TABLET ORAL
Qty: 90 TAB | Refills: 1 | Status: SHIPPED | OUTPATIENT
Start: 2020-11-13 | End: 2021-05-10

## 2021-06-21 DIAGNOSIS — I10 ESSENTIAL HYPERTENSION: ICD-10-CM

## 2021-06-21 RX ORDER — LISINOPRIL AND HYDROCHLOROTHIAZIDE 10; 12.5 MG/1; MG/1
TABLET ORAL
Qty: 30 TABLET | Refills: 0 | Status: SHIPPED | OUTPATIENT
Start: 2021-06-21 | End: 2021-07-07 | Stop reason: ALTCHOICE

## 2021-07-07 ENCOUNTER — OFFICE VISIT (OUTPATIENT)
Dept: PRIMARY CARE CLINIC | Age: 56
End: 2021-07-07
Payer: COMMERCIAL

## 2021-07-07 VITALS
HEART RATE: 88 BPM | RESPIRATION RATE: 15 BRPM | SYSTOLIC BLOOD PRESSURE: 126 MMHG | OXYGEN SATURATION: 99 % | HEIGHT: 64 IN | DIASTOLIC BLOOD PRESSURE: 84 MMHG | BODY MASS INDEX: 32.95 KG/M2 | TEMPERATURE: 97.8 F | WEIGHT: 193 LBS

## 2021-07-07 DIAGNOSIS — E66.9 OBESITY (BMI 30.0-34.9): ICD-10-CM

## 2021-07-07 DIAGNOSIS — Z12.11 COLON CANCER SCREENING: ICD-10-CM

## 2021-07-07 DIAGNOSIS — I10 ESSENTIAL HYPERTENSION: Primary | ICD-10-CM

## 2021-07-07 DIAGNOSIS — E78.2 MIXED HYPERLIPIDEMIA: ICD-10-CM

## 2021-07-07 PROCEDURE — 99214 OFFICE O/P EST MOD 30 MIN: CPT | Performed by: INTERNAL MEDICINE

## 2021-07-07 RX ORDER — GLUCOSAMINE SULFATE 1500 MG
POWDER IN PACKET (EA) ORAL DAILY
COMMUNITY

## 2021-07-07 RX ORDER — LISINOPRIL AND HYDROCHLOROTHIAZIDE 10; 12.5 MG/1; MG/1
1 TABLET ORAL DAILY
Qty: 90 TABLET | Refills: 1 | Status: SHIPPED | OUTPATIENT
Start: 2021-07-07 | End: 2021-10-05

## 2021-07-07 NOTE — PROGRESS NOTES
Miriam Lancaster (: 1965) is a 54 y.o. female, established patient, here for evaluation of the following chief complaint(s):  Medication Refill (BP med refill)   Written by Brenna Evans, as dictated by Dr. Savannah Haider MD.      ASSESSMENT/PLAN:  Below is the assessment and plan developed based on review of pertinent history, physical exam, labs, studies, and medications. 1. Essential hypertension  Continue taking lisinopril-HCTZ 10-12.5 mg as prescribed. -     lisinopril-hydroCHLOROthiazide (PRINZIDE, ZESTORETIC) 10-12.5 mg per tablet; Take 1 Tablet by mouth daily for 90 days. , Normal, Disp-90 Tablet, R-1 sent to pharmacy. 2. Mixed hyperlipidemia  Patient explained that she had lab work done recently with chiropractor. I told her to sent me the results. Needs to see how her Lipid profile. 3. Colon cancer screening  I ordered a stool test for health maintenance. 4. Obesity (BMI 30.0-34.9)  I commended the pt on her healthy lifestyle choices and routines. Explained that 5,000 steps are needed daily for healthy lifestyle and to maintain conditioning, and she will need to increase to 10,000 a day to work on weight loss. SUBJECTIVE/OBJECTIVE:  HPI  Patient presents today for a routine visit. She has gained weight from 191 lb on 2/10/20 to 193 lb today. She is interested in losing weight, and has cut processed food from her diet. She takes multivitamin, Vitamin D, magnesium, and Zinc supplement. She exercises 2-3x/week. Her BP today is 126/84. She takes lisinopril-HCTZ 10-12.5 mg for HTN. She has palpitations occassionally when she eats food high in sugar and carbs. She is followed by a chiropractor for neck stiffness. She occassionally has difficulty sleeping, as well as nocturia. She is followed by Dr. Causey, OB/GYN. Her most recent pap smear in 2018 was unremarkable. She occassionally gets hot flashes, but they are not bothersome.     Patient Active Problem List   Diagnosis Code    Essential hypertension I10        Current Outpatient Medications on File Prior to Visit   Medication Sig Dispense Refill    zinc sulfate (ZINC-220 PO) Take  by mouth.  acetylcysteine (NAC PO) Take  by mouth.  cholecalciferol (Vitamin D3) 25 mcg (1,000 unit) cap Take  by mouth daily.  multivitamin (ONE A DAY) tablet Take 1 Tab by mouth daily.  MAGNESIUM GLUCONATE, BULK, by Does Not Apply route.  [DISCONTINUED] lisinopril-hydroCHLOROthiazide (PRINZIDE, ZESTORETIC) 10-12.5 mg per tablet TAKE ONE TABLET BY MOUTH DAILY **MUST CALL MD FOR APPOINTMENT 30 Tablet 0    [DISCONTINUED] progesterone (PROMETRIUM) 100 mg capsule Take 100 mg by mouth daily. (Patient not taking: Reported on 7/7/2021)      [DISCONTINUED] conjugated estrogens (PREMARIN) 0.625 mg/gram vaginal cream Insert 0.5 g into vagina daily. (Patient not taking: Reported on 7/7/2021)       No current facility-administered medications on file prior to visit.        No Known Allergies    Past Medical History:   Diagnosis Date    Hypertension        Past Surgical History:   Procedure Laterality Date    HX HEENT  12/20/2018    eye lid surgery       Family History   Problem Relation Age of Onset    Hypertension Father        Social History     Socioeconomic History    Marital status: SINGLE     Spouse name: Not on file    Number of children: Not on file    Years of education: Not on file    Highest education level: Not on file   Occupational History    Not on file   Tobacco Use    Smoking status: Never Smoker    Smokeless tobacco: Never Used   Vaping Use    Vaping Use: Never used   Substance and Sexual Activity    Alcohol use: No    Drug use: No    Sexual activity: Yes     Partners: Male     Birth control/protection: None   Other Topics Concern    Not on file   Social History Narrative    Not on file     Social Determinants of Health     Financial Resource Strain:     Difficulty of Paying Living Expenses:    Food Insecurity:     Worried About Running Out of Food in the Last Year:     [address] in the Last Year:    Transportation Needs:     Lack of Transportation (Medical):  Lack of Transportation (Non-Medical):    Physical Activity:     Days of Exercise per Week:     Minutes of Exercise per Session:    Stress:     Feeling of Stress :    Social Connections:     Frequency of Communication with Friends and Family:     Frequency of Social Gatherings with Friends and Family:     Attends [employer] Services:     Active Member of Clubs or Organizations:     Attends Club or Organization Meetings:     Marital Status:    Intimate Partner Violence:     Fear of Current or Ex-Partner:     Emotionally Abused:     Physically Abused:     Sexually Abused:        No visits with results within 3 Month(s) from this visit. Latest known visit with results is:   Office Visit on 02/10/2020   Component Date Value Ref Range Status    Cholesterol, total 02/10/2020 245* 100 - 199 mg/dL Final    Triglyceride 02/10/2020 202* 0 - 149 mg/dL Final    HDL Cholesterol 02/10/2020 61  >39 mg/dL Final    VLDL, calculated 02/10/2020 40  5 - 40 mg/dL Final    LDL, calculated 02/10/2020 144* 0 - 99 mg/dL Final    WBC 02/10/2020 4.2  3.4 - 10.8 x10E3/uL Final    RBC 02/10/2020 4.92  3.77 - 5.28 x10E6/uL Final    HGB 02/10/2020 14.8  11.1 - 15.9 g/dL Final    HCT 02/10/2020 45.0  34.0 - 46.6 % Final    MCV 02/10/2020 92  79 - 97 fL Final    MCH 02/10/2020 30.1  26.6 - 33.0 pg Final    MCHC 02/10/2020 32.9  31.5 - 35.7 g/dL Final    RDW 02/10/2020 12.8  11.7 - 15.4 % Final    PLATELET 86/55/4566 902  150 - 450 x10E3/uL Final    TSH 02/10/2020 2. 110  0.450 - 4.500 uIU/mL Final    Glucose 02/10/2020 92  65 - 99 mg/dL Final    BUN 02/10/2020 11  6 - 24 mg/dL Final    Creatinine 02/10/2020 0.84  0.57 - 1.00 mg/dL Final    GFR est non-AA 02/10/2020 79  >59 mL/min/1.73 Final    GFR est AA 02/10/2020 91  >59 mL/min/1.73 Final  BUN/Creatinine ratio 02/10/2020 13  9 - 23 Final    Sodium 02/10/2020 138  134 - 144 mmol/L Final    Potassium 02/10/2020 4.5  3.5 - 5.2 mmol/L Final    Chloride 02/10/2020 96  96 - 106 mmol/L Final    CO2 02/10/2020 22  20 - 29 mmol/L Final    Calcium 02/10/2020 9.7  8.7 - 10.2 mg/dL Final    Protein, total 02/10/2020 6.7  6.0 - 8.5 g/dL Final    Albumin 02/10/2020 4.7  3.8 - 4.9 g/dL Final                  **Please note reference interval change**    GLOBULIN, TOTAL 02/10/2020 2.0  1.5 - 4.5 g/dL Final    A-G Ratio 02/10/2020 2.4* 1.2 - 2.2 Final    Bilirubin, total 02/10/2020 0.4  0.0 - 1.2 mg/dL Final    Alk. phosphatase 02/10/2020 61  39 - 117 IU/L Final    AST (SGOT) 02/10/2020 23  0 - 40 IU/L Final    ALT (SGPT) 02/10/2020 20  0 - 32 IU/L Final      Review of Systems   Constitutional: Negative for activity change, fatigue and unexpected weight change. HENT: Negative for congestion, hearing loss, rhinorrhea and sore throat. Eyes: Negative for discharge. Respiratory: Negative for cough, chest tightness and shortness of breath. Cardiovascular: Negative for leg swelling. Gastrointestinal: Negative for abdominal pain, constipation and diarrhea. Genitourinary: Positive for dysuria. Negative for flank pain, frequency and urgency. Musculoskeletal: Positive for neck stiffness. Negative for arthralgias, back pain and myalgias. Skin: Negative for color change and rash. Neurological: Negative for dizziness, light-headedness and headaches. Psychiatric/Behavioral: Positive for sleep disturbance. Negative for dysphoric mood. The patient is not nervous/anxious. Visit Vitals  /84 (BP 1 Location: Left arm)   Pulse 88   Temp 97.8 °F (36.6 °C)   Resp 15   Ht 5' 4\" (1.626 m)   Wt 193 lb (87.5 kg)   SpO2 99%   BMI 33.13 kg/m²      Physical Exam  Vitals and nursing note reviewed. Constitutional:       General: She is not in acute distress. Appearance: Normal appearance.  She is well-developed. She is not diaphoretic. HENT:      Right Ear: External ear normal.      Left Ear: External ear normal.   Eyes:      General: No scleral icterus. Right eye: No discharge. Left eye: No discharge. Extraocular Movements: Extraocular movements intact. Conjunctiva/sclera: Conjunctivae normal.   Cardiovascular:      Rate and Rhythm: Normal rate and regular rhythm. Pulmonary:      Effort: Pulmonary effort is normal.      Breath sounds: Normal breath sounds. No wheezing. Abdominal:      General: Bowel sounds are normal.      Palpations: Abdomen is soft. Tenderness: There is no abdominal tenderness. Musculoskeletal:      Cervical back: Normal range of motion and neck supple. Lymphadenopathy:      Cervical: No cervical adenopathy. Neurological:      Mental Status: She is alert and oriented to person, place, and time. Psychiatric:         Mood and Affect: Mood and affect normal.            An electronic signature was used to authenticate this note.   -- Anirudh Morillo

## 2021-07-07 NOTE — PROGRESS NOTES
Chief Complaint   Patient presents with    Medication Refill     BP med refill       Visit Vitals  /84 (BP 1 Location: Left arm)   Pulse 88   Temp 97.8 °F (36.6 °C)   Resp 15   Ht 5' 4\" (1.626 m)   Wt 193 lb (87.5 kg)   SpO2 99%   BMI 33.13 kg/m²       1. Have you been to the ER, urgent care clinic since your last visit? Hospitalized since your last visit? No    2. Have you seen or consulted any other health care providers outside of the [address] [place] [address] since your last visit? Include any pap smears or colon screening.  No

## 2022-08-24 ENCOUNTER — TRANSCRIBE ORDER (OUTPATIENT)
Dept: SCHEDULING | Age: 57
End: 2022-08-24

## 2022-08-24 DIAGNOSIS — Z00.00 PREVENTATIVE HEALTH CARE: Primary | ICD-10-CM

## 2022-08-24 DIAGNOSIS — Z12.31 SCREENING MAMMOGRAM FOR HIGH-RISK PATIENT: Primary | ICD-10-CM

## 2022-09-06 ENCOUNTER — HOSPITAL ENCOUNTER (OUTPATIENT)
Dept: CT IMAGING | Age: 57
Discharge: HOME | End: 2022-09-06
Attending: INTERNAL MEDICINE
Payer: SELF-PAY

## 2022-09-06 DIAGNOSIS — Z00.00 PREVENTATIVE HEALTH CARE: ICD-10-CM

## 2022-09-06 PROCEDURE — 75571 CT HRT W/O DYE W/CA TEST: CPT

## 2022-09-07 NOTE — CARDIO/PULMONARY
Reached patient at her given mobile number. Patient states she has seen her coronary artery CT score of zero on [employer]. Discussed the meaning of this score. Patient has no further questions at this time.